# Patient Record
Sex: FEMALE | Race: WHITE | NOT HISPANIC OR LATINO | Employment: OTHER | URBAN - METROPOLITAN AREA
[De-identification: names, ages, dates, MRNs, and addresses within clinical notes are randomized per-mention and may not be internally consistent; named-entity substitution may affect disease eponyms.]

---

## 2018-02-14 ENCOUNTER — HOSPITAL ENCOUNTER (OUTPATIENT)
Facility: MEDICAL CENTER | Age: 63
End: 2018-02-14
Attending: OBSTETRICS & GYNECOLOGY
Payer: COMMERCIAL

## 2018-02-14 PROCEDURE — 88175 CYTOPATH C/V AUTO FLUID REDO: CPT

## 2018-02-16 LAB — CYTOLOGY REG CYTOL: NORMAL

## 2018-03-09 ENCOUNTER — HOSPITAL ENCOUNTER (OUTPATIENT)
Dept: LAB | Facility: MEDICAL CENTER | Age: 63
End: 2018-03-09
Attending: OBSTETRICS & GYNECOLOGY
Payer: COMMERCIAL

## 2018-03-09 LAB
APPEARANCE UR: CLEAR
BILIRUB UR QL STRIP.AUTO: NEGATIVE
COLOR UR: YELLOW
GLUCOSE UR STRIP.AUTO-MCNC: NEGATIVE MG/DL
KETONES UR STRIP.AUTO-MCNC: NEGATIVE MG/DL
LEUKOCYTE ESTERASE UR QL STRIP.AUTO: NEGATIVE
MICRO URNS: NORMAL
NITRITE UR QL STRIP.AUTO: NEGATIVE
PH UR STRIP.AUTO: 6 [PH]
PROT UR QL STRIP: NEGATIVE MG/DL
RBC UR QL AUTO: NEGATIVE
SP GR UR STRIP.AUTO: 1.01
UROBILINOGEN UR STRIP.AUTO-MCNC: 0.2 MG/DL

## 2018-03-09 PROCEDURE — 81003 URINALYSIS AUTO W/O SCOPE: CPT

## 2018-03-09 PROCEDURE — 87086 URINE CULTURE/COLONY COUNT: CPT

## 2018-03-11 LAB
BACTERIA UR CULT: NORMAL
SIGNIFICANT IND 70042: NORMAL
SITE SITE: NORMAL
SOURCE SOURCE: NORMAL

## 2018-03-13 ENCOUNTER — OFFICE VISIT (OUTPATIENT)
Dept: MEDICAL GROUP | Facility: PHYSICIAN GROUP | Age: 63
End: 2018-03-13
Payer: COMMERCIAL

## 2018-03-13 VITALS
SYSTOLIC BLOOD PRESSURE: 134 MMHG | BODY MASS INDEX: 28.68 KG/M2 | RESPIRATION RATE: 16 BRPM | WEIGHT: 168 LBS | OXYGEN SATURATION: 98 % | HEIGHT: 64 IN | DIASTOLIC BLOOD PRESSURE: 82 MMHG | HEART RATE: 80 BPM

## 2018-03-13 DIAGNOSIS — Z00.00 HEALTHCARE MAINTENANCE: ICD-10-CM

## 2018-03-13 DIAGNOSIS — E04.1 THYROID NODULE: ICD-10-CM

## 2018-03-13 DIAGNOSIS — G89.29 CHRONIC RUQ PAIN: ICD-10-CM

## 2018-03-13 DIAGNOSIS — K21.9 GASTROESOPHAGEAL REFLUX DISEASE, ESOPHAGITIS PRESENCE NOT SPECIFIED: ICD-10-CM

## 2018-03-13 DIAGNOSIS — E78.2 MIXED HYPERLIPIDEMIA: ICD-10-CM

## 2018-03-13 DIAGNOSIS — Z90.710 POST HYSTERECTOMY MENOPAUSE: ICD-10-CM

## 2018-03-13 DIAGNOSIS — E89.40 POST HYSTERECTOMY MENOPAUSE: ICD-10-CM

## 2018-03-13 DIAGNOSIS — E55.9 VITAMIN D INSUFFICIENCY: ICD-10-CM

## 2018-03-13 DIAGNOSIS — R10.11 CHRONIC RUQ PAIN: ICD-10-CM

## 2018-03-13 PROCEDURE — 99214 OFFICE O/P EST MOD 30 MIN: CPT | Performed by: NURSE PRACTITIONER

## 2018-03-13 RX ORDER — FAMOTIDINE 20 MG/1
20 TABLET, FILM COATED ORAL 2 TIMES DAILY
COMMUNITY
End: 2020-10-20

## 2018-03-13 RX ORDER — ATORVASTATIN CALCIUM 20 MG/1
TABLET, FILM COATED ORAL
Qty: 24 TAB | Refills: 1 | Status: SHIPPED | OUTPATIENT
Start: 2018-03-13 | End: 2018-08-09 | Stop reason: SDUPTHER

## 2018-03-13 RX ORDER — CHOLECALCIFEROL (VITAMIN D3) 125 MCG
CAPSULE ORAL
COMMUNITY

## 2018-03-13 RX ORDER — CHLORAL HYDRATE 500 MG
1000 CAPSULE ORAL
COMMUNITY
End: 2020-10-20

## 2018-03-13 ASSESSMENT — PATIENT HEALTH QUESTIONNAIRE - PHQ9: CLINICAL INTERPRETATION OF PHQ2 SCORE: 0

## 2018-03-13 NOTE — ASSESSMENT & PLAN NOTE
This is a chronic condition, controlled. She has brought in a copy of her last lipid profile, total cholesterol is 260, triglycerides are 176, HDL is 35 and her LDLs are 162. She states she has been tried on several different statins and is intolerant due to muscle aches. However she has never tried taking it just once or twice a week. She also states that she has never tried fenofibrate or other non-statin medications. She would like to try taking statin twice weekly, this has been called in for her. We will recheck lipids again in 3 months.

## 2018-03-13 NOTE — ASSESSMENT & PLAN NOTE
Patient has history of thyroid nodule, she is followed with thyroid labs and ultrasound of her thyroid, this has not been done for 2 years, thyroid ultrasound was ordered along with labs.

## 2018-03-13 NOTE — ASSESSMENT & PLAN NOTE
She is due for labs, please ordered. However we will wait on lipid profile as we are going to start her on a statin (see additional notes) and she will repeat labs again in 3 months. She is up-to-date with mammogram. She is followed by gynecology. She is up-to-date with colonoscopy and immunizations.

## 2018-03-13 NOTE — PROGRESS NOTES
Chief Complaint   Patient presents with   • Establish Care         This is a 62 y.o.female patient that presents today with the following: Establish care with new PCP, discuss acute and chronic conditions    Healthcare maintenance  She is due for labs, please ordered. However we will wait on lipid profile as we are going to start her on a statin (see additional notes) and she will repeat labs again in 3 months. She is up-to-date with mammogram. She is followed by gynecology. She is up-to-date with colonoscopy and immunizations.    Thyroid nodule  Patient has history of thyroid nodule, she is followed with thyroid labs and ultrasound of her thyroid, this has not been done for 2 years, thyroid ultrasound was ordered along with labs.    Mixed hyperlipidemia  This is a chronic condition, controlled. She has brought in a copy of her last lipid profile, total cholesterol is 260, triglycerides are 176, HDL is 35 and her LDLs are 162. She states she has been tried on several different statins and is intolerant due to muscle aches. However she has never tried taking it just once or twice a week. She also states that she has never tried fenofibrate or other non-statin medications. She would like to try taking statin twice weekly, this has been called in for her. We will recheck lipids again in 3 months.    Post hysterectomy menopause  Patient has vaginal menopausal symptoms since having her hysterectomy when she was in her early 40s. She is on Premarin, which is prescribed by her gynecologist. She tolerates this medication well with no significant bothersome side effects. She does understand the risks with ongoing hormone replacement therapy. She does have annual mammograms, in fact she does had her annual mammogram last week.    Gastroesophageal reflux disease  This is a chronic condition, stable and well controlled with famotidine 20 mg twice daily. She tolerates this well with no significant bothersome side effects. She  does buy this over-the-counter. She has been having chronic right upper quadrant pain and has been told in the past this could possibly be related to her gallbladder, it has not improved but it has not worsened either. Sometime in the future, she states that she would like to have this worked up but will come in sooner if the pain worsens. She states that it does get worse after eating high fat meals but she tries to avoid this.    Chronic RUQ pain  See additional notes on reflux    Vitamin D insufficiency  This is a chronic condition, stable and fairly well-controlled with over-the-counter vitamin D supplements. She is due for labs, these were ordered.      Hospital Outpatient Visit on 03/09/2018   Component Date Value   • Color 03/09/2018 Yellow    • Character 03/09/2018 Clear    • Specific Gravity 03/09/2018 1.011    • Ph 03/09/2018 6.0    • Glucose 03/09/2018 Negative    • Ketones 03/09/2018 Negative    • Protein 03/09/2018 Negative    • Bilirubin 03/09/2018 Negative    • Urobilinogen, Urine 03/09/2018 0.2    • Nitrite 03/09/2018 Negative    • Leukocyte Esterase 03/09/2018 Negative    • Occult Blood 03/09/2018 Negative    • Micro Urine Req 03/09/2018 see below    • Significant Indicator 03/09/2018 NEG    • Source 03/09/2018 UR    • Site 03/09/2018 Clean Catch    • Urine Culture 03/09/2018 Mixed skin jennyfer <10,000 cfu/mL    Hospital Outpatient Visit on 02/14/2018   Component Date Value   • Cytology Reg 02/14/2018 See Path Report          clinical course has been stable    Past Medical History:   Diagnosis Date   • Hyperlipidemia        Past Surgical History:   Procedure Laterality Date   • ABDOMINAL HYSTERECTOMY TOTAL     • BLADDER SLING FEMALE     • TONSILLECTOMY         History reviewed. No pertinent family history.    Sulfa drugs    Current Outpatient Prescriptions Or[facility] in [facility]   Medication Sig Dispense Refill   • Cholecalciferol (VITAMIN D3) 2000 units Tab Take  by mouth.     • Multiple Vitamins-Minerals  "(CENTRUM SILVER 50+WOMEN PO) Take  by mouth.     • Omega-3 Fatty Acids (FISH OIL) 1000 MG Cap capsule Take 1,000 mg by mouth 3 times a day, with meals.     • aspirin 81 MG tablet Take 81 mg by mouth every day.     • famotidine (PEPCID) 20 MG Tab Take 20 mg by mouth 2 times a day.     • D-MANNOSE PO Take  by mouth.     • atorvastatin (LIPITOR) 20 MG Tab Take 1 pill twice a week 24 Tab 1   • conjugated estrogen (PREMARIN) 0.625 MG/GM Cream Insert 0.5 g in vagina every day.       No current [facility]-ordered facility-administered medications on file.        Constitutional ROS: No unexpected change in weight, No weakness, No unexplained fevers, sweats, or chills  Pulmonary ROS: No chronic cough, sputum, or hemoptysis, No shortness of breath, No recent change in breathing  Cardiovascular ROS: No chest pain, No edema, No palpitations, Positive for hyperlipidemia, per history of present illness  Gastrointestinal ROS: Positive per history of present illness  Musculoskeletal/Extremities ROS: No clubbing, No peripheral edema, No pain, redness or swelling on the joints  Neurologic ROS: Normal development, No seizures, No weakness  Genitourinary ROS: Positive per history of present illness  Endocrine ROS: Positive per history of present illness    Physical exam:  /82   Pulse 80   Resp 16   Ht 1.626 m (5' 4\")   Wt 76.2 kg (168 lb)   SpO2 98%   BMI 28.84 kg/m²   General Appearance: Middle-aged older female, alert, no distress, moderately overweight, well-groomed  Skin: Skin color, texture, turgor normal. No rashes or lesions.  Lungs: negative findings: normal respiratory rate and rhythm, lungs clear to auscultation  Heart: negative. RRR without murmur, gallop, or rubs.  No ectopy.  Abdomen: Abdomen soft, non-tender. BS normal. No masses,  No organomegaly  Musculoskeletal: negative findings: ROM of all joints is normal, no evidence of joint instability, strength normal, no deformities present  Neurologic: intact, oriented, " mood appropriate, judgment intact. Cranial nerves II through XII grossly intact  Pelvic: deferred, patient is seen by OB/GYN    Medical decision making/discussion: Patient will have thyroid ultrasound for further surveillance of thyroid nodule. She is to have labs done sometime in April, but she is to have lipid profile done in late May or early June. She is going to start atorvastatin 20 mg twice weekly. She is to take her other medications as prescribed and call for refills as needed. She is to continue care per her OB/GYN.    Xiomara was seen today for establish care.    Diagnoses and all orders for this visit:    Thyroid nodule  -     TSH WITH REFLEX TO FT4; Future  -     US-SOFT TISSUES OF HEAD - NECK; Future    Mixed hyperlipidemia  -     COMP METABOLIC PANEL; Future  -     LIPID PROFILE; Future  -     atorvastatin (LIPITOR) 20 MG Tab; Take 1 pill twice a week    Post hysterectomy menopause    Gastroesophageal reflux disease, esophagitis presence not specified    Chronic RUQ pain    Vitamin D insufficiency  -     VITAMIN D,25 HYDROXY; Future    Healthcare maintenance          Please note that this dictation was created using voice recognition software. I have made every reasonable attempt to correct obvious errors, but I expect that there are errors of grammar and possibly content that I did not discover before finalizing the note.

## 2018-03-13 NOTE — PATIENT INSTRUCTIONS
thryoid us    Labs in April but lipids in late May or early June    Start a statin, only twice a week    Will plan on further working up the abdominal

## 2018-03-13 NOTE — ASSESSMENT & PLAN NOTE
Patient has vaginal menopausal symptoms since having her hysterectomy when she was in her early 40s. She is on Premarin, which is prescribed by her gynecologist. She tolerates this medication well with no significant bothersome side effects. She does understand the risks with ongoing hormone replacement therapy. She does have annual mammograms, in fact she does had her annual mammogram last week.

## 2018-03-13 NOTE — ASSESSMENT & PLAN NOTE
This is a chronic condition, stable and fairly well-controlled with over-the-counter vitamin D supplements. She is due for labs, these were ordered.

## 2018-03-13 NOTE — ASSESSMENT & PLAN NOTE
This is a chronic condition, stable and well controlled with famotidine 20 mg twice daily. She tolerates this well with no significant bothersome side effects. She does buy this over-the-counter. She has been having chronic right upper quadrant pain and has been told in the past this could possibly be related to her gallbladder, it has not improved but it has not worsened either. Sometime in the future, she states that she would like to have this worked up but will come in sooner if the pain worsens. She states that it does get worse after eating high fat meals but she tries to avoid this.

## 2018-03-26 ENCOUNTER — HOSPITAL ENCOUNTER (OUTPATIENT)
Dept: RADIOLOGY | Facility: MEDICAL CENTER | Age: 63
End: 2018-03-26
Attending: NURSE PRACTITIONER
Payer: COMMERCIAL

## 2018-03-26 DIAGNOSIS — E04.1 THYROID NODULE: ICD-10-CM

## 2018-03-26 PROCEDURE — 76536 US EXAM OF HEAD AND NECK: CPT

## 2018-03-29 ENCOUNTER — TELEPHONE (OUTPATIENT)
Dept: MEDICAL GROUP | Facility: PHYSICIAN GROUP | Age: 63
End: 2018-03-29

## 2018-03-29 NOTE — TELEPHONE ENCOUNTER
Called [phone] [facility] for patient to call [phone] to go over provider notes.

## 2018-03-29 NOTE — TELEPHONE ENCOUNTER
----- Message from JANUSZ Dumont sent at 3/28/2018 12:59 PM PDT -----  Please let pt know that I have reviewed the results of her thyroid US. It would be important to be able to compare this to previous ultrasound as nodules are seen, but may need to be biopsied, unless they are unchanged from previous exams. Where would we be able to get a hold of these results?

## 2018-03-30 ENCOUNTER — TELEPHONE (OUTPATIENT)
Dept: MEDICAL GROUP | Facility: PHYSICIAN GROUP | Age: 63
End: 2018-03-30

## 2018-04-25 LAB — 25(OH)D3+25(OH)D2 SERPL-MCNC: 37 NG/ML (ref 30–100)

## 2018-06-13 LAB
ALBUMIN SERPL-MCNC: 4.3 G/DL (ref 3.6–4.8)
ALBUMIN/GLOB SERPL: 1.7 {RATIO} (ref 1.2–2.2)
ALP SERPL-CCNC: 100 IU/L (ref 39–117)
ALT SERPL-CCNC: 24 IU/L (ref 0–32)
AST SERPL-CCNC: 24 IU/L (ref 0–40)
BILIRUB SERPL-MCNC: 0.6 MG/DL (ref 0–1.2)
BUN SERPL-MCNC: 14 MG/DL (ref 8–27)
BUN/CREAT SERPL: 16 (ref 12–28)
CALCIUM SERPL-MCNC: 9.7 MG/DL (ref 8.7–10.3)
CHLORIDE SERPL-SCNC: 105 MMOL/L (ref 96–106)
CHOLEST SERPL-MCNC: 184 MG/DL (ref 100–199)
CO2 SERPL-SCNC: 22 MMOL/L (ref 20–29)
CREAT SERPL-MCNC: 0.88 MG/DL (ref 0.57–1)
GFR SERPLBLD CREATININE-BSD FMLA CKD-EPI: 70 ML/MIN/1.73
GFR SERPLBLD CREATININE-BSD FMLA CKD-EPI: 81 ML/MIN/1.73
GLOBULIN SER CALC-MCNC: 2.6 G/DL (ref 1.5–4.5)
GLUCOSE SERPL-MCNC: 96 MG/DL (ref 65–99)
HDLC SERPL-MCNC: 55 MG/DL
LABORATORY COMMENT REPORT: ABNORMAL
LDLC SERPL CALC-MCNC: 101 MG/DL (ref 0–99)
POTASSIUM SERPL-SCNC: 4.8 MMOL/L (ref 3.5–5.2)
PROT SERPL-MCNC: 6.9 G/DL (ref 6–8.5)
SODIUM SERPL-SCNC: 144 MMOL/L (ref 134–144)
TRIGL SERPL-MCNC: 139 MG/DL (ref 0–149)
TSH SERPL DL<=0.005 MIU/L-ACNC: 0.41 UIU/ML (ref 0.45–4.5)
VLDLC SERPL CALC-MCNC: 28 MG/DL (ref 5–40)

## 2018-06-20 ENCOUNTER — OFFICE VISIT (OUTPATIENT)
Dept: MEDICAL GROUP | Facility: PHYSICIAN GROUP | Age: 63
End: 2018-06-20
Payer: COMMERCIAL

## 2018-06-20 VITALS
DIASTOLIC BLOOD PRESSURE: 72 MMHG | HEART RATE: 86 BPM | HEIGHT: 64 IN | TEMPERATURE: 97.6 F | RESPIRATION RATE: 16 BRPM | SYSTOLIC BLOOD PRESSURE: 120 MMHG | BODY MASS INDEX: 28.51 KG/M2 | WEIGHT: 167 LBS | OXYGEN SATURATION: 97 %

## 2018-06-20 DIAGNOSIS — E04.1 THYROID NODULE: ICD-10-CM

## 2018-06-20 DIAGNOSIS — E78.2 MIXED HYPERLIPIDEMIA: ICD-10-CM

## 2018-06-20 PROCEDURE — 99214 OFFICE O/P EST MOD 30 MIN: CPT | Performed by: NURSE PRACTITIONER

## 2018-06-20 NOTE — PROGRESS NOTES
Chief Complaint   Patient presents with   • Hyperlipidemia     fv labs         This is a 62 y.o.female patient that presents today with the following:follow up, review labs    Mixed hyperlipidemia  This is a chronic condition, stable and controlled with atorvastatin. Recent lipid profile is as follows:  Component      Latest Ref Rng & Units 6/12/2018          10:45 AM   Cholesterol,Tot      100 - 199 mg/dL 184   Triglycerides      0 - 149 mg/dL 139   HDL      >39 mg/dL 55   VLDL Cholesterol Calc      5 - 40 mg/dL 28   LDL      0 - 99 mg/dL 101 (H)   She is having increased muscle aches, even only taking this twice a week. Will have her try taking it once a week at 40 mg. Will have to consider another medication if she continues to have this side effect.    Thyroid nodule  Pt has history of thyroid nodules. She recently had thyroid US, when compared to the US she had back in 2015, it is essentially unchanged. Her thyroid labs are WNL. Will plan on repeating this in 3-6 months with labs before visit. She denies s/sx of hypothyroidism.      No visits with results within 1 Month(s) from this visit.   Latest known visit with results is:   Orders Only on 04/24/2018   Component Date Value   • 25-Hydroxy   Vitamin D 25 04/24/2018 37.0    • Glucose 06/12/2018 96    • Bun 06/12/2018 14    • Creatinine 06/12/2018 0.88    • GFR If Non  Ameri* 06/12/2018 70    • GFR If  06/12/2018 81    • Bun-Creatinine Ratio 06/12/2018 16    • Sodium 06/12/2018 144    • Potassium 06/12/2018 4.8    • Chloride 06/12/2018 105    • Co2 06/12/2018 22    • Calcium 06/12/2018 9.7    • Total Protein 06/12/2018 6.9    • Albumin 06/12/2018 4.3    • Globulin 06/12/2018 2.6    • A-G Ratio 06/12/2018 1.7    • Total Bilirubin 06/12/2018 0.6    • Alkaline Phosphatase 06/12/2018 100    • AST(SGOT) 06/12/2018 24    • ALT(SGPT) 06/12/2018 24    • Cholesterol,Tot 06/12/2018 184    • Triglycerides 06/12/2018 139    • HDL 06/12/2018 55    •  "VLDL Cholesterol Calc 06/12/2018 28    • LDL 06/12/2018 101*   • Comment: 06/12/2018 CANCELED    • TSH 06/12/2018 0.414*         clinical course has been stable    Past Medical History:   Diagnosis Date   • Hyperlipidemia        Past Surgical History:   Procedure Laterality Date   • ABDOMINAL HYSTERECTOMY TOTAL     • BLADDER SLING FEMALE     • TONSILLECTOMY         History reviewed. No pertinent family history.    Sulfa drugs    Current Outpatient Prescriptions Ordered in [facility]   Medication Sig Dispense Refill   • Cholecalciferol (VITAMIN D3) 2000 units Tab Take  by mouth.     • Multiple Vitamins-Minerals (CENTRUM SILVER 50+WOMEN PO) Take  by mouth.     • Omega-3 Fatty Acids (FISH OIL) 1000 MG Cap capsule Take 1,000 mg by mouth 3 times a day, with meals.     • aspirin 81 MG tablet Take 81 mg by mouth every day.     • famotidine (PEPCID) 20 MG Tab Take 20 mg by mouth 2 times a day.     • D-MANNOSE PO Take  by mouth.     • atorvastatin (LIPITOR) 20 MG Tab Take 1 pill twice a week 24 Tab 1   • conjugated estrogen (PREMARIN) 0.625 MG/GM Cream Insert 0.5 g in vagina every day.       No current [facility]-ordered facility-administered medications on file.        Constitutional ROS: No unexpected change in weight, No weakness, No unexplained fevers, sweats, or chills  Pulmonary ROS: No chronic cough, sputum, or hemoptysis, No shortness of breath, No recent change in breathing  Cardiovascular ROS: No chest pain, No edema, No palpitations. Positive for hyperlipidemia  Musculoskeletal/Extremities ROS: No clubbing, No peripheral edema, No redness or swelling on the joints. Positive for myalgias  Neurologic ROS: Normal development, No seizures, No weakness  Endocrine ROS: positive per HPI    Physical exam:  /72   Pulse 86   Temp 36.4 °C (97.6 °F)   Resp 16   Ht 1.626 m (5' 4\")   Wt 75.8 kg (167 lb)   SpO2 97%   BMI 28.67 kg/m²   General Appearance: older female, alert, no distress, over weight, well groomed  Skin: Skin " color, texture, turgor normal. No rashes or lesions.  Neck: negative findings: no adenopathy, trachea midline and normal to palpitation. Thyroid nodular  Lungs: negative findings: normal respiratory rate and rhythm, normal effort, clear to auscultation throughout  Heart: negative. RRR without murmur, gallop, or rubs.  No ectopy.  Abdomen: Abdomen soft, non-tender. BS normal. No masses,  No organomegaly  Musculoskeletal: negative findings: ROM of all joints is normal, no evidence of joint instability, strength normal, no deformities present  Neurologic: intact, CN 2-12 grossly intact    Medical decision making/discussion: will have her increase atorvastatin to 40 mg once a week. Will also check labs and thyroid US in 3-6 months,  She is then to follow up with me  After that.     Xiomara was seen today for hyperlipidemia.    Diagnoses and all orders for this visit:    Thyroid nodule  -     TSH WITH REFLEX TO FT4; Future  -     US-SOFT TISSUES OF HEAD - NECK; Future    Mixed hyperlipidemia          Please note that this dictation was created using voice recognition software. I have made every reasonable attempt to correct obvious errors, but I expect that there are errors of grammar and possibly content that I did not discover before finalizing the note.

## 2018-06-20 NOTE — PATIENT INSTRUCTIONS
Will have you increase the atorvastatin to 40 mg, but only take once a week    Let's check one more US in 6 months, labs as well, then see me after

## 2018-06-21 NOTE — ASSESSMENT & PLAN NOTE
Pt has history of thyroid nodules. She recently had thyroid US, when compared to the US she had back in 2015, it is essentially unchanged. Her thyroid labs are WNL. Will plan on repeating this in 3-6 months with labs before visit. She denies s/sx of hypothyroidism.

## 2018-06-21 NOTE — ASSESSMENT & PLAN NOTE
This is a chronic condition, stable and controlled with atorvastatin. Recent lipid profile is as follows:  Component      Latest Ref Rng & Units 6/12/2018          10:45 AM   Cholesterol,Tot      100 - 199 mg/dL 184   Triglycerides      0 - 149 mg/dL 139   HDL      >39 mg/dL 55   VLDL Cholesterol Calc      5 - 40 mg/dL 28   LDL      0 - 99 mg/dL 101 (H)   She is having increased muscle aches, even only taking this twice a week. Will have her try taking it once a week at 40 mg. Will have to consider another medication if she continues to have this side effect.

## 2018-08-09 DIAGNOSIS — E78.2 MIXED HYPERLIPIDEMIA: ICD-10-CM

## 2018-08-10 RX ORDER — ATORVASTATIN CALCIUM 20 MG/1
TABLET, FILM COATED ORAL
Qty: 24 TAB | Refills: 0 | Status: SHIPPED | OUTPATIENT
Start: 2018-08-10 | End: 2019-01-18 | Stop reason: SDUPTHER

## 2018-08-10 NOTE — TELEPHONE ENCOUNTER
Was the patient seen in the last year in this department? Yes    Does patient have an active prescription for medications requested? No     Received Request Via: Pharmacy      Pt met protocol?: Yes, OV 6/18   Lab Results   Component Value Date/Time    CHOLSTRLTOT 184 06/12/2018 10:45 AM     (H) 06/12/2018 10:45 AM    HDL 55 06/12/2018 10:45 AM    TRIGLYCERIDE 139 06/12/2018 10:45 AM       Lab Results   Component Value Date/Time    SODIUM 144 06/12/2018 10:45 AM    POTASSIUM 4.8 06/12/2018 10:45 AM    CHLORIDE 105 06/12/2018 10:45 AM    CO2 22 06/12/2018 10:45 AM    GLUCOSE 96 06/12/2018 10:45 AM    BUN 14 06/12/2018 10:45 AM    CREATININE 0.88 06/12/2018 10:45 AM    BUNCREATRAT 16 06/12/2018 10:45 AM     Lab Results   Component Value Date/Time    ALKPHOSPHAT 100 06/12/2018 10:45 AM    ASTSGOT 24 06/12/2018 10:45 AM    ALTSGPT 24 06/12/2018 10:45 AM    TBILIRUBIN 0.6 06/12/2018 10:45 AM

## 2018-08-15 ENCOUNTER — OFFICE VISIT (OUTPATIENT)
Dept: URGENT CARE | Facility: PHYSICIAN GROUP | Age: 63
End: 2018-08-15
Payer: COMMERCIAL

## 2018-08-15 ENCOUNTER — HOSPITAL ENCOUNTER (OUTPATIENT)
Facility: MEDICAL CENTER | Age: 63
End: 2018-08-15
Attending: PHYSICIAN ASSISTANT
Payer: COMMERCIAL

## 2018-08-15 VITALS
HEART RATE: 108 BPM | DIASTOLIC BLOOD PRESSURE: 88 MMHG | TEMPERATURE: 98 F | SYSTOLIC BLOOD PRESSURE: 140 MMHG | HEIGHT: 64 IN | OXYGEN SATURATION: 97 % | WEIGHT: 166 LBS | RESPIRATION RATE: 18 BRPM | BODY MASS INDEX: 28.34 KG/M2

## 2018-08-15 DIAGNOSIS — R10.30 LOWER ABDOMINAL PAIN: ICD-10-CM

## 2018-08-15 LAB
APPEARANCE UR: NORMAL
BILIRUB UR STRIP-MCNC: NORMAL MG/DL
COLOR UR AUTO: YELLOW
GLUCOSE UR STRIP.AUTO-MCNC: NORMAL MG/DL
KETONES UR STRIP.AUTO-MCNC: NORMAL MG/DL
LEUKOCYTE ESTERASE UR QL STRIP.AUTO: NORMAL
NITRITE UR QL STRIP.AUTO: NORMAL
PH UR STRIP.AUTO: 5 [PH] (ref 5–8)
PROT UR QL STRIP: NORMAL MG/DL
RBC UR QL AUTO: NORMAL
SP GR UR STRIP.AUTO: 1.02
UROBILINOGEN UR STRIP-MCNC: 0.2 MG/DL

## 2018-08-15 PROCEDURE — 87086 URINE CULTURE/COLONY COUNT: CPT

## 2018-08-15 PROCEDURE — 81002 URINALYSIS NONAUTO W/O SCOPE: CPT | Performed by: PHYSICIAN ASSISTANT

## 2018-08-15 PROCEDURE — 99214 OFFICE O/P EST MOD 30 MIN: CPT | Performed by: PHYSICIAN ASSISTANT

## 2018-08-15 RX ORDER — NITROFURANTOIN 25; 75 MG/1; MG/1
100 CAPSULE ORAL EVERY 12 HOURS
Qty: 10 CAP | Refills: 0 | Status: SHIPPED | OUTPATIENT
Start: 2018-08-15 | End: 2018-08-20

## 2018-08-15 NOTE — PROGRESS NOTES
Chief Complaint   Patient presents with   • UTI     cramping/ lower abd pain/ x1wk on and off       HISTORY OF PRESENT ILLNESS: Patient is a 62 y.o. female who presents today for the following:    Patient comes in for evaluation of lower abdominal cramping and pain over the last 2 weeks.  She has had multiple pelvic surgeries and states she does not have classic urinary tract infection symptoms.  She reports very rare dysuria stating she mostly has abdominal cramping.  She states that this has happened to her in the past with urinary tract infections since her pelvic surgeries.  She denies nausea, vomiting, and any significant changes in her bowels.  She has not noticed any fever, night sweats, chills, or body aches.  She is followed by urogyn.    Patient Active Problem List    Diagnosis Date Noted   • Thyroid nodule 03/13/2018   • Mixed hyperlipidemia 03/13/2018   • Post hysterectomy menopause 03/13/2018   • Gastroesophageal reflux disease 03/13/2018   • Chronic RUQ pain 03/13/2018   • Vitamin D insufficiency 03/13/2018   • Healthcare maintenance 03/13/2018       Allergies:Sulfa drugs    Current Outpatient Prescriptions Ordered in [facility]   Medication Sig Dispense Refill   • nitrofurantoin monohydr macro (MACROBID) 100 MG Cap Take 1 Cap by mouth every 12 hours for 5 days. 10 Cap 0   • atorvastatin (LIPITOR) 20 MG Tab TAKE 1 TABLET BY MOUTH TWICE WEEKLY 24 Tab 0   • Cholecalciferol (VITAMIN D3) 2000 units Tab Take  by mouth.     • Multiple Vitamins-Minerals (CENTRUM SILVER 50+WOMEN PO) Take  by mouth.     • Omega-3 Fatty Acids (FISH OIL) 1000 MG Cap capsule Take 1,000 mg by mouth 3 times a day, with meals.     • aspirin 81 MG tablet Take 81 mg by mouth every day.     • famotidine (PEPCID) 20 MG Tab Take 20 mg by mouth 2 times a day.     • D-MANNOSE PO Take  by mouth.     • conjugated estrogen (PREMARIN) 0.625 MG/GM Cream Insert 0.5 g in vagina every day.       No current [facility]-ordered facility-administered medications  "on file.        Past Medical History:   Diagnosis Date   • Hyperlipidemia        Social History   Substance Use Topics   • Smoking status: Never Smoker   • Smokeless tobacco: Never Used   • Alcohol use No       No family status information on file.   No family history on file.    Review of Systems:   Constitutional ROS: No unexpected change in weight, No weakness, No fatigue  Eye ROS: No recent significant change in vision, No eye pain, redness, discharge  Ear ROS: No drainage, No tinnitus or vertigo, No recent change in hearing  Mouth/Throat ROS: No teeth or gum problems, No bleeding gums, No tongue complaints  Neck ROS: No swollen glands, No significant pain in neck  Pulmonary ROS: No chronic cough, sputum, or hemoptysis, No dyspnea on exertion, No wheezing  Cardiovascular ROS: No diaphoresis, No edema, No palpitations  Gastrointestinal ROS: No change in bowel habits, No significant change in appetite, No nausea, vomiting, diarrhea, or constipation  Musculoskeletal/Extremities ROS: No peripheral edema, No pain, redness or swelling on the joints  Hematologic/Lymphatic ROS: No chills, No night sweats, No weight loss  Skin/Integumentary ROS: No edema, No evidence of rash, No itching      Exam:  Blood pressure 140/88, pulse (!) 108, temperature 36.7 °C (98 °F), resp. rate 18, height 1.626 m (5' 4\"), weight 75.3 kg (166 lb), SpO2 97 %.  General: Well developed, well nourished. No distress.  HEENT: Head is grossly normal.  Pulmonary: Unlabored respiratory effort.   Neurologic: Grossly nonfocal. No facial asymmetry noted.  Skin: Warm, dry, good turgor. No rashes in visible areas.   Psych: Normal mood. Alert and oriented x3. Judgment and insight is normal.    UA: Trace blood, otherwise negative    Assessment/Plan:  Patient symptoms are not classic for UTI but will treat based on her history.  Will contact patient with culture results.  1. Lower abdominal pain  Urine Culture    POCT Urinalysis    nitrofurantoin monohydr " macro (MACROBID) 100 MG Cap

## 2018-08-16 DIAGNOSIS — R10.30 LOWER ABDOMINAL PAIN: ICD-10-CM

## 2018-08-18 LAB
BACTERIA UR CULT: NORMAL
SIGNIFICANT IND 70042: NORMAL
SITE SITE: NORMAL
SOURCE SOURCE: NORMAL

## 2018-08-21 ENCOUNTER — TELEPHONE (OUTPATIENT)
Dept: MEDICAL GROUP | Facility: PHYSICIAN GROUP | Age: 63
End: 2018-08-21

## 2018-08-21 NOTE — TELEPHONE ENCOUNTER
Called and informed pt of urine culture results pt states she finished her medication and is feeling better but will inform us of any changes and any continued symptoms

## 2018-12-04 ENCOUNTER — HOSPITAL ENCOUNTER (OUTPATIENT)
Dept: RADIOLOGY | Facility: MEDICAL CENTER | Age: 63
End: 2018-12-04
Attending: NURSE PRACTITIONER
Payer: COMMERCIAL

## 2018-12-04 DIAGNOSIS — E04.1 THYROID NODULE: ICD-10-CM

## 2018-12-04 PROCEDURE — 76536 US EXAM OF HEAD AND NECK: CPT

## 2018-12-05 ENCOUNTER — HOSPITAL ENCOUNTER (OUTPATIENT)
Dept: LAB | Facility: MEDICAL CENTER | Age: 63
End: 2018-12-05
Attending: NURSE PRACTITIONER
Payer: COMMERCIAL

## 2018-12-05 DIAGNOSIS — E04.1 THYROID NODULE: ICD-10-CM

## 2018-12-05 LAB — TSH SERPL DL<=0.005 MIU/L-ACNC: 0.55 UIU/ML (ref 0.38–5.33)

## 2018-12-05 PROCEDURE — 36415 COLL VENOUS BLD VENIPUNCTURE: CPT

## 2018-12-05 PROCEDURE — 84443 ASSAY THYROID STIM HORMONE: CPT

## 2018-12-10 ENCOUNTER — OFFICE VISIT (OUTPATIENT)
Dept: MEDICAL GROUP | Facility: PHYSICIAN GROUP | Age: 63
End: 2018-12-10
Payer: COMMERCIAL

## 2018-12-10 VITALS
SYSTOLIC BLOOD PRESSURE: 120 MMHG | RESPIRATION RATE: 16 BRPM | HEIGHT: 64 IN | DIASTOLIC BLOOD PRESSURE: 82 MMHG | TEMPERATURE: 98.2 F | WEIGHT: 165 LBS | HEART RATE: 76 BPM | BODY MASS INDEX: 28.17 KG/M2 | OXYGEN SATURATION: 98 %

## 2018-12-10 DIAGNOSIS — E55.9 VITAMIN D INSUFFICIENCY: ICD-10-CM

## 2018-12-10 DIAGNOSIS — Z12.39 SCREENING FOR BREAST CANCER: ICD-10-CM

## 2018-12-10 DIAGNOSIS — E78.2 MIXED HYPERLIPIDEMIA: ICD-10-CM

## 2018-12-10 DIAGNOSIS — E04.1 THYROID NODULE: ICD-10-CM

## 2018-12-10 PROCEDURE — 99214 OFFICE O/P EST MOD 30 MIN: CPT | Performed by: NURSE PRACTITIONER

## 2018-12-10 NOTE — ASSESSMENT & PLAN NOTE
Patient has history of vitamin D deficiency, she will be due for labs again in 1 year.  She does continue to take an over-the-counter vitamin D supplement.

## 2018-12-10 NOTE — ASSESSMENT & PLAN NOTE
Patient has history of thyroid nodules, she recently had repeat thyroid ultrasound which is essentially is unchanged from ultrasound done in March 2018 as well as the one done in 2015.  Her thyroid labs have been within normal limits.  She will be due for labs and repeat ultrasound again in 1 year.

## 2018-12-10 NOTE — ASSESSMENT & PLAN NOTE
The 10-year ASCVD risk score (Gricelda MEDEROS Jr., et al., 2013) is: 4.2%    Values used to calculate the score:      Age: 63 years      Sex: Female      Is Non- : No      Diabetic: No      Tobacco smoker: No      Systolic Blood Pressure: 120 mmHg      Is BP treated: No      HDL Cholesterol: 55 mg/dL      Total Cholesterol: 184 mg/dL  Patient continues on atorvastatin 20 mg 1 pill weekly and is doing well.  She does not need refills at this time, she was advised to eat healthy low-fat, low-cholesterol diet, regular physical activity and continued efforts towards weight loss.  We will plan on rechecking labs again in 1 year.

## 2018-12-10 NOTE — PROGRESS NOTES
Chief Complaint   Patient presents with   • Hypothyroidism     fv US, labs         This is a 63 y.o.female patient that presents today with the following: Follow-up, review ultrasound and lab results    Thyroid nodule  Patient has history of thyroid nodules, she recently had repeat thyroid ultrasound which is essentially is unchanged from ultrasound done in March 2018 as well as the one done in 2015.  Her thyroid labs have been within normal limits.  She will be due for labs and repeat ultrasound again in 1 year.    Mixed hyperlipidemia  The 10-year ASCVD risk score (Musella GATITO Jr., et al., 2013) is: 4.2%    Values used to calculate the score:      Age: 63 years      Sex: Female      Is Non- : No      Diabetic: No      Tobacco smoker: No      Systolic Blood Pressure: 120 mmHg      Is BP treated: No      HDL Cholesterol: 55 mg/dL      Total Cholesterol: 184 mg/dL  Patient continues on atorvastatin 20 mg 1 pill weekly and is doing well.  She does not need refills at this time, she was advised to eat healthy low-fat, low-cholesterol diet, regular physical activity and continued efforts towards weight loss.  We will plan on rechecking labs again in 1 year.    Vitamin D insufficiency  Patient has history of vitamin D deficiency, she will be due for labs again in 1 year.  She does continue to take an over-the-counter vitamin D supplement.      Hospital Outpatient Visit on 12/05/2018   Component Date Value   • TSH 12/05/2018 0.550          clinical course has been stable    Past Medical History:   Diagnosis Date   • Hyperlipidemia        Past Surgical History:   Procedure Laterality Date   • ABDOMINAL HYSTERECTOMY TOTAL     • BLADDER SLING FEMALE     • TONSILLECTOMY         No family history on file.    Sulfa drugs    Current Outpatient Prescriptions Ordered in [facility]   Medication Sig Dispense Refill   • atorvastatin (LIPITOR) 20 MG Tab TAKE 1 TABLET BY MOUTH TWICE WEEKLY (Patient taking differently:  "TAKE 1 TABLET BY MOUTH ONCE WEEKLY) 24 Tab 0   • Cholecalciferol (VITAMIN D3) 2000 units Tab Take  by mouth.     • Multiple Vitamins-Minerals (CENTRUM SILVER 50+WOMEN PO) Take  by mouth.     • Omega-3 Fatty Acids (FISH OIL) 1000 MG Cap capsule Take 1,000 mg by mouth 3 times a day, with meals.     • aspirin 81 MG tablet Take 81 mg by mouth every day.     • famotidine (PEPCID) 20 MG Tab Take 20 mg by mouth 2 times a day.     • conjugated estrogen (PREMARIN) 0.625 MG/GM Cream Insert 0.5 g in vagina every day.     • D-MANNOSE PO Take  by mouth.       No current Epic-ordered facility-administered medications on file.        Constitutional ROS: No unexpected change in weight, No weakness, No unexplained fevers, sweats, or chills  Neck ROS: Positive per HPI  Pulmonary ROS: No chronic cough, sputum, or hemoptysis, No shortness of breath, No recent change in breathing  Cardiovascular ROS: No chest pain, No edema, No palpitations, Positive for hyperlipidemia  Musculoskeletal/Extremities ROS: No clubbing, No peripheral edema, No pain, redness or swelling on the joints  Neurologic ROS: Normal development, No seizures, No weakness  Endocrine ROS: Positive per HPI    Physical exam:  /82 (BP Location: Right arm, Patient Position: Sitting, BP Cuff Size: Adult)   Pulse 76   Temp 36.8 °C (98.2 °F) (Temporal)   Resp 16   Ht 1.626 m (5' 4\")   Wt 74.8 kg (165 lb)   SpO2 98%   BMI 28.32 kg/m²   General Appearance: Older female, alert, no distress, overweight, well-groomed  Skin: Skin color, texture, turgor normal. No rashes or lesions.  Neck: Neck supple. No adenopathy. Thyroid symmetric, normal size, and without nodularity  Lungs: negative findings: normal respiratory rate and rhythm, lungs clear to auscultation  Heart: negative. RRR without murmur, gallop, or rubs.  No ectopy.  Abdomen: Abdomen soft, non-tender. BS normal. No masses,  No organomegaly  Musculoskeletal: negative findings: no evidence of joint instability, no " evidence of muscle atrophy, no deformities present  Neurologic: intact, CN II through XII grossly intact    Medical decision making/discussion: Patient to follow-up with me annually with routine labs done before visit.  We will plan on repeating thyroid ultrasound in one year.  She is to see me sooner should she develop other signs and symptoms of hypothyroidism.  Mammogram has been ordered, she is to have this done sometime in late March 2019.    Xiomara was seen today for hypothyroidism.    Diagnoses and all orders for this visit:    Mixed hyperlipidemia  -     COMP METABOLIC PANEL; Future  -     Lipid Profile; Future    Thyroid nodule  -     TSH WITH REFLEX TO FT4; Future    Vitamin D insufficiency  -     VITAMIN D,25 HYDROXY; Future    Screening for breast cancer  -     MA-SCREEN MAMMO W/CAD-BILAT; Future          Please note that this dictation was created using voice recognition software. I have made every reasonable attempt to correct obvious errors, but I expect that there are errors of grammar and possibly content that I did not discover before finalizing the note.

## 2018-12-25 ENCOUNTER — OFFICE VISIT (OUTPATIENT)
Dept: URGENT CARE | Facility: PHYSICIAN GROUP | Age: 63
End: 2018-12-25
Payer: COMMERCIAL

## 2018-12-25 VITALS
DIASTOLIC BLOOD PRESSURE: 82 MMHG | HEIGHT: 65 IN | TEMPERATURE: 98.4 F | SYSTOLIC BLOOD PRESSURE: 124 MMHG | OXYGEN SATURATION: 98 % | BODY MASS INDEX: 26.89 KG/M2 | WEIGHT: 161.4 LBS | HEART RATE: 79 BPM | RESPIRATION RATE: 16 BRPM

## 2018-12-25 DIAGNOSIS — J22 ACUTE RESPIRATORY INFECTION: ICD-10-CM

## 2018-12-25 DIAGNOSIS — R05.9 COUGH: ICD-10-CM

## 2018-12-25 DIAGNOSIS — J20.9 ACUTE BRONCHITIS, UNSPECIFIED ORGANISM: ICD-10-CM

## 2018-12-25 PROCEDURE — 99214 OFFICE O/P EST MOD 30 MIN: CPT | Performed by: FAMILY MEDICINE

## 2018-12-25 RX ORDER — BENZONATATE 200 MG/1
CAPSULE ORAL
Qty: 30 CAP | Refills: 0 | Status: SHIPPED | OUTPATIENT
Start: 2018-12-25 | End: 2019-06-11

## 2018-12-25 RX ORDER — AMOXICILLIN 875 MG/1
TABLET, COATED ORAL
Qty: 20 TAB | Refills: 0 | Status: SHIPPED | OUTPATIENT
Start: 2018-12-25 | End: 2019-06-11

## 2018-12-25 NOTE — PROGRESS NOTES
Chief Complaint:    Chief Complaint   Patient presents with   • Cough     x 2 weeks   • Congestion       History of Present Illness:    This is a new problem. Symptoms x 2 weeks. Has occl subjective fever, rhinorrhea, mild intermittent sore throat, and cough which was worse last night. Overall symptoms are at least moderate severity and not getting better. She occl gets Bronchitis and feels she is getting it again. Amoxil and Tessalon work/tolerates for similar previous symptoms.      Review of Systems:    Constitutional: See HPI.   Eyes: Negative for change in vision, photophobia, pain, redness, and discharge.  ENT: See HPI.   Respiratory: See HPI.   Cardiovascular: Negative for chest pain, palpitations, orthopnea, claudication, leg swelling, and PND.   Gastrointestinal: Negative for abdominal pain, nausea, vomiting, diarrhea, constipation, blood in stool, and melena.   Genitourinary: Negative for dysuria, urinary urgency, urinary frequency, hematuria, and flank pain.   Musculoskeletal: Negative for myalgias, joint pain, neck pain, and back pain.   Skin: Negative for rash and itching.   Neurological: Negative for dizziness, tingling, tremors, sensory change, speech change, focal weakness, seizures, loss of consciousness, and headaches.   Endo: Negative for polydipsia.   Heme: Does not bruise/bleed easily.   Psychiatric/Behavioral: Negative for depression, suicidal ideas, hallucinations, memory loss and substance abuse. The patient is not nervous/anxious and does not have insomnia.        Past Medical History:    Past Medical History:   Diagnosis Date   • Hyperlipidemia      Past Surgical History:    Past Surgical History:   Procedure Laterality Date   • ABDOMINAL HYSTERECTOMY TOTAL     • BLADDER SLING FEMALE     • TONSILLECTOMY       Social History:    Social History     Social History   • Marital status:      Spouse name: N/A   • Number of children: N/A   • Years of education: N/A     Occupational History  "  • Not on file.     Social History Main Topics   • Smoking status: Never Smoker   • Smokeless tobacco: Never Used   • Alcohol use No   • Drug use: No   • Sexual activity: Not on file     Other Topics Concern   • Not on file     Social History Narrative   • No narrative on file     Family History:    History reviewed. No pertinent family history.    Medications:    Current Outpatient Prescriptions on File Prior to Visit   Medication Sig Dispense Refill   • atorvastatin (LIPITOR) 20 MG Tab TAKE 1 TABLET BY MOUTH TWICE WEEKLY (Patient taking differently: TAKE 1 TABLET BY MOUTH ONCE WEEKLY) 24 Tab 0   • Cholecalciferol (VITAMIN D3) 2000 units Tab Take  by mouth.     • Multiple Vitamins-Minerals (CENTRUM SILVER 50+WOMEN PO) Take  by mouth.     • Omega-3 Fatty Acids (FISH OIL) 1000 MG Cap capsule Take 1,000 mg by mouth 3 times a day, with meals.     • aspirin 81 MG tablet Take 81 mg by mouth every day.     • famotidine (PEPCID) 20 MG Tab Take 20 mg by mouth 2 times a day.     • conjugated estrogen (PREMARIN) 0.625 MG/GM Cream Insert 0.5 g in vagina every day.     • D-MANNOSE PO Take  by mouth.       No current facility-administered medications on file prior to visit.      Allergies:    Allergies   Allergen Reactions   • Sulfa Drugs Rash and Vomiting       Vitals:    Vitals:    12/25/18 1129   BP: 124/82   Pulse: 79   Resp: 16   Temp: 36.9 °C (98.4 °F)   TempSrc: Temporal   SpO2: 98%   Weight: 73.2 kg (161 lb 6.4 oz)   Height: 1.638 m (5' 4.5\")       Physical Exam:    Constitutional: Vital signs reviewed. Appears well-developed and well-nourished. No acute distress.   Eyes: Sclera white, conjunctivae clear.  ENT: External ears normal. External auditory canals normal without discharge. TMs translucent and non-bulging. Hearing normal. Nasal mucosa erythematous. Lips/teeth are normal. Oral mucosa pink and moist. Posterior pharynx: WNL.  Neck: Neck supple.   Cardiovascular: Regular rate and rhythm. No " murmur.  Pulmonary/Chest: Respirations non-labored. Clear to auscultation bilaterally.  Lymph: Cervical nodes without tenderness or enlargement.  Musculoskeletal: Normal gait. Normal range of motion. No muscular atrophy or weakness.  Neurological: Alert and oriented to person, place, and time. Muscle tone normal. Coordination normal.   Skin: No rashes or lesions. Warm, dry, normal turgor.  Psychiatric: Normal mood and affect. Behavior is normal. Judgment and thought content normal.       Assessment / Plan:    1. Acute respiratory infection  - amoxicillin (AMOXIL) 875 MG tablet; 1 TAB TWICE A DAY X 10 DAYS.  Dispense: 20 Tab; Refill: 0    2. Acute bronchitis, unspecified organism    3. Cough  - benzonatate (TESSALON) 200 MG capsule; 1 CAP UP TO 3 TIMES A DAY ONLY IF NEEDED FOR COUGH.  Dispense: 30 Cap; Refill: 0      Discussed with her DDX, management options, and risks, benefits, and alternatives to treatment plan agreed upon.    Agreeable to medications prescribed.    Discussed expected course of duration, time for improvement, and to seek follow-up in Emergency Room, urgent care, or with PCP if getting worse at any time or not improving within expected time frame.

## 2019-02-14 ENCOUNTER — OFFICE VISIT (OUTPATIENT)
Dept: MEDICAL GROUP | Facility: PHYSICIAN GROUP | Age: 64
End: 2019-02-14
Payer: COMMERCIAL

## 2019-02-14 VITALS
TEMPERATURE: 98.2 F | HEIGHT: 65 IN | BODY MASS INDEX: 27.32 KG/M2 | RESPIRATION RATE: 16 BRPM | DIASTOLIC BLOOD PRESSURE: 62 MMHG | WEIGHT: 164 LBS | SYSTOLIC BLOOD PRESSURE: 122 MMHG | OXYGEN SATURATION: 97 % | HEART RATE: 70 BPM

## 2019-02-14 DIAGNOSIS — K21.9 GASTROESOPHAGEAL REFLUX DISEASE, ESOPHAGITIS PRESENCE NOT SPECIFIED: ICD-10-CM

## 2019-02-14 DIAGNOSIS — Z87.19 HISTORY OF IBS: ICD-10-CM

## 2019-02-14 PROCEDURE — 99214 OFFICE O/P EST MOD 30 MIN: CPT | Performed by: NURSE PRACTITIONER

## 2019-02-14 RX ORDER — DICYCLOMINE HYDROCHLORIDE 10 MG/1
10 CAPSULE ORAL
Qty: 120 CAP | Refills: 1 | Status: SHIPPED
Start: 2019-02-14 | End: 2020-02-18

## 2019-02-14 RX ORDER — CALCIUM POLYCARBOPHIL 625 MG 625 MG/1
625 TABLET ORAL DAILY
COMMUNITY
End: 2020-10-20

## 2019-02-14 ASSESSMENT — PATIENT HEALTH QUESTIONNAIRE - PHQ9: CLINICAL INTERPRETATION OF PHQ2 SCORE: 0

## 2019-02-14 NOTE — PATIENT INSTRUCTIONS
Consider taking probiotic    Continue use of stool softeners or can try Miralax    Continue using simethicone    Avoid aggravating foods    Irritable Bowel Syndrome, Adult  Irritable bowel syndrome (IBS) is not one specific disease. It is a group of symptoms that affects the organs responsible for digestion (gastrointestinal or GI tract).  To regulate how your GI tract works, your body sends signals back and forth between your intestines and your brain. If you have IBS, there may be a problem with these signals. As a result, your GI tract does not function normally. Your intestines may become more sensitive and overreact to certain things. This is especially true when you eat certain foods or when you are under stress.  There are four types of IBS. These may be determined based on the consistency of your stool:  · IBS with diarrhea.  · IBS with constipation.  · Mixed IBS.  · Unsubtyped IBS.  It is important to know which type of IBS you have. Some treatments are more likely to be helpful for certain types of IBS.  What are the causes?  The exact cause of IBS is not known.  What increases the risk?  You may have a higher risk of IBS if:  · You are a woman.  · You are younger than 45 years old.  · You have a family history of IBS.  · You have mental health problems.  · You have had bacterial infection of your GI tract.  What are the signs or symptoms?  Symptoms of IBS vary from person to person. The main symptom is abdominal pain or discomfort. Additional symptoms usually include one or more of the following:  · Diarrhea, constipation, or both.  · Abdominal swelling or bloating.  · Feeling full or sick after eating a small or regular-size meal.  · Frequent gas.  · Mucus in the stool.  · A feeling of having more stool left after a bowel movement.  Symptoms tend to come and go. They may be associated with stress, psychiatric conditions, or nothing at all.  How is this diagnosed?  There is no specific test to diagnose  IBS. Your health care provider will make a diagnosis based on a physical exam, medical history, and your symptoms. You may have other tests to rule out other conditions that may be causing your symptoms. These may include:  · Blood tests.  · X-rays.  · CT scan.  · Endoscopy and colonoscopy. This is a test in which your GI tract is viewed with a long, thin, flexible tube.  How is this treated?  There is no cure for IBS, but treatment can help relieve symptoms. IBS treatment often includes:  · Changes to your diet, such as:  ¨ Eating more fiber.  ¨ Avoiding foods that cause symptoms.  ¨ Drinking more water.  ¨ Eating regular, medium-sized portioned meals.  · Medicines. These may include:  ¨ Fiber supplements if you have constipation.  ¨ Medicine to control diarrhea (antidiarrheal medicines).  ¨ Medicine to help control muscle spasms in your GI tract (antispasmodic medicines).  ¨ Medicines to help with any mental health issues, such as antidepressants or tranquilizers.  · Therapy.  ¨ Talk therapy may help with anxiety, depression, or other mental health issues that can make IBS symptoms worse.  · Stress reduction.  ¨ Managing your stress can help keep symptoms under control.  Follow these instructions at home:  · Take medicines only as directed by your health care provider.  · Eat a healthy diet.  ¨ Avoid foods and drinks with added sugar.  ¨ Include more whole grains, fruits, and vegetables gradually into your diet. This may be especially helpful if you have IBS with constipation.  ¨ Avoid any foods and drinks that make your symptoms worse. These may include dairy products and caffeinated or carbonated drinks.  ¨ Do not eat large meals.  ¨ Drink enough fluid to keep your urine clear or pale yellow.  · Exercise regularly. Ask your health care provider for recommendations of good activities for you.  · Keep all follow-up visits as directed by your health care provider. This is important.  Contact a health care provider  if:  · You have constant pain.  · You have trouble or pain with swallowing.  · You have worsening diarrhea.  Get help right away if:  · You have severe and worsening abdominal pain.  · You have diarrhea and:  ¨ You have a rash, stiff neck, or severe headache.  ¨ You are irritable, sleepy, or difficult to awaken.  ¨ You are weak, dizzy, or extremely thirsty.  · You have bright red blood in your stool or you have black tarry stools.  · You have unusual abdominal swelling that is painful.  · You vomit continuously.  · You vomit blood (hematemesis).  · You have both abdominal pain and a fever.  This information is not intended to replace advice given to you by your health care provider. Make sure you discuss any questions you have with your health care provider.  Document Released: 12/18/2006 Document Revised: 05/19/2017 Document Reviewed: 09/04/2015  [employer] Interactive Patient Education © 2017 [employer]

## 2019-02-14 NOTE — PROGRESS NOTES
Chief Complaint   Patient presents with   • Abdominal Pain     GERD, IBS         This is a 63 y.o.female patient that presents today with the following: Abdominal pain, discussed GERD and IBS    Gastroesophageal reflux disease  This is a chronic condition, stable and usually well controlled with daily use of PPI.  She states her symptoms have recently worsened and she eats certain foods.  She does admit to me that she does not take this on an empty stomach first thing in the morning, she will start doing so and try to continue avoiding aggravating foods and activities.  She also feels this is aggravating her IBS (see additional notes).    History of IBS  Patient reports history of IBS, diagnosed several years ago by previous PCP.  She does report a history of alternating bowel patterns from diarrhea to constipation, but as of late she has been constipation predominant.  She does take a daily stool softener which seems to help.  She is also reporting abdominal cramps especially after certain foods.  She has been using simethicone which seems to help a bit.  She was given printed education material on IBS.  She will start dicyclomine 10 mg 4 times daily, she was advised to continue using Gas-X and will consider starting a probiotic.  She is to follow-up with me in 2-3 months, sooner if needed.  Discussed with her that if symptoms do not improve with these interventions, she may need there see gastroenterology again.      No visits with results within 1 Month(s) from this visit.   Latest known visit with results is:   Hospital Outpatient Visit on 12/05/2018   Component Date Value   • TSH 12/05/2018 0.550          clinical course has been stable    Past Medical History:   Diagnosis Date   • Hyperlipidemia        Past Surgical History:   Procedure Laterality Date   • ABDOMINAL HYSTERECTOMY TOTAL     • BLADDER SLING FEMALE     • TONSILLECTOMY         History reviewed. No pertinent family history.    Sulfa drugs    Current  "Outpatient Prescriptions Ordered in [facility]   Medication Sig Dispense Refill   • calcium polycarbophil (FIBERCON) 625 MG Tab Take 625 mg by mouth every day.     • dicyclomine (BENTYL) 10 MG Cap Take 1 Cap by mouth 4 Times a Day,Before Meals and at Bedtime. 120 Cap 1   • atorvastatin (LIPITOR) 20 MG Tab TAKE 1 TABLET BY MOUTH ONCE WEEKLY 12 Tab 3   • benzonatate (TESSALON) 200 MG capsule 1 CAP UP TO 3 TIMES A DAY ONLY IF NEEDED FOR COUGH. 30 Cap 0   • Cholecalciferol (VITAMIN D3) 2000 units Tab Take  by mouth.     • Multiple Vitamins-Minerals (CENTRUM SILVER 50+WOMEN PO) Take  by mouth.     • Omega-3 Fatty Acids (FISH OIL) 1000 MG Cap capsule Take 1,000 mg by mouth 3 times a day, with meals.     • aspirin 81 MG tablet Take 81 mg by mouth every day.     • famotidine (PEPCID) 20 MG Tab Take 20 mg by mouth 2 times a day.     • D-MANNOSE PO Take  by mouth.     • conjugated estrogen (PREMARIN) 0.625 MG/GM Cream Insert 0.5 g in vagina every day.     • amoxicillin (AMOXIL) 875 MG tablet 1 TAB TWICE A DAY X 10 DAYS. 20 Tab 0     No current Epic-ordered facility-administered medications on file.        Constitutional ROS: No unexpected change in weight, No weakness, No unexplained fevers, sweats, or chills  Pulmonary ROS: No chronic cough, sputum, or hemoptysis, No shortness of breath, No recent change in breathing  Cardiovascular ROS: No chest pain  Gastrointestinal ROS: Positive per HPI  Musculoskeletal/Extremities ROS: No clubbing, No peripheral edema, No pain, redness or swelling on the joints  Neurologic ROS: Normal development, No seizures, No weakness    Physical exam:  /62 (BP Location: Right arm, Patient Position: Sitting, BP Cuff Size: Adult)   Pulse 70   Temp 36.8 °C (98.2 °F) (Temporal)   Resp 16   Ht 1.638 m (5' 4.5\")   Wt 74.4 kg (164 lb)   SpO2 97%   BMI 27.72 kg/m²   General Appearance: Very pleasant older female, alert, no distress, moderately overweight, well-groomed  Skin: Skin color, texture, " turgor normal. No rashes or lesions.  Lungs: negative findings: normal respiratory rate and rhythm, normal effort  Abdomen: positive findings:  tenderness mild generalized but mostly in epigastric region.  Negative findings: Abdomen soft, positive bowel sounds.  Musculoskeletal: negative findings: no evidence of joint instability, no evidence of muscle atrophy, no deformities present  Neurologic: intact    Medical decision making/discussion: Patient will start dicyclomine as mentioned above.  She was given printed education material on IBS, advised to continue with keeping stools soft and avoiding aggravating foods and activities.  She is to consider taking probiotic.  She was also reminded to take PPI on empty stomach first thing in the morning.  She is to follow-up with me in 3 months.    Xiomara was seen today for abdominal pain.    Diagnoses and all orders for this visit:    Gastroesophageal reflux disease, esophagitis presence not specified    History of IBS  -     dicyclomine (BENTYL) 10 MG Cap; Take 1 Cap by mouth 4 Times a Day,Before Meals and at Bedtime.          Please note that this dictation was created using voice recognition software. I have made every reasonable attempt to correct obvious errors, but I expect that there are errors of grammar and possibly content that I did not discover before finalizing the note.

## 2019-02-17 NOTE — ASSESSMENT & PLAN NOTE
This is a chronic condition, stable and usually well controlled with daily use of PPI.  She states her symptoms have recently worsened and she eats certain foods.  She does admit to me that she does not take this on an empty stomach first thing in the morning, she will start doing so and try to continue avoiding aggravating foods and activities.  She also feels this is aggravating her IBS (see additional notes).

## 2019-02-17 NOTE — ASSESSMENT & PLAN NOTE
Patient reports history of IBS, diagnosed several years ago by previous PCP.  She does report a history of alternating bowel patterns from diarrhea to constipation, but as of late she has been constipation predominant.  She does take a daily stool softener which seems to help.  She is also reporting abdominal cramps especially after certain foods.  She has been using simethicone which seems to help a bit.  She was given printed education material on IBS.  She will start dicyclomine 10 mg 4 times daily, she was advised to continue using Gas-X and will consider starting a probiotic.  She is to follow-up with me in 2-3 months, sooner if needed.  Discussed with her that if symptoms do not improve with these interventions, she may need there see gastroenterology again.

## 2019-06-11 ENCOUNTER — OFFICE VISIT (OUTPATIENT)
Dept: URGENT CARE | Facility: PHYSICIAN GROUP | Age: 64
End: 2019-06-11
Payer: COMMERCIAL

## 2019-06-11 ENCOUNTER — HOSPITAL ENCOUNTER (OUTPATIENT)
Facility: MEDICAL CENTER | Age: 64
End: 2019-06-11
Attending: FAMILY MEDICINE
Payer: COMMERCIAL

## 2019-06-11 VITALS
RESPIRATION RATE: 14 BRPM | WEIGHT: 159 LBS | TEMPERATURE: 97.9 F | SYSTOLIC BLOOD PRESSURE: 128 MMHG | OXYGEN SATURATION: 97 % | BODY MASS INDEX: 26.87 KG/M2 | DIASTOLIC BLOOD PRESSURE: 86 MMHG | HEART RATE: 110 BPM

## 2019-06-11 DIAGNOSIS — R10.9 ABDOMINAL PAIN, UNSPECIFIED ABDOMINAL LOCATION: ICD-10-CM

## 2019-06-11 DIAGNOSIS — N39.0 ACUTE URINARY TRACT INFECTION: ICD-10-CM

## 2019-06-11 LAB
APPEARANCE UR: NORMAL
BILIRUB UR STRIP-MCNC: NORMAL MG/DL
COLOR UR AUTO: YELLOW
GLUCOSE UR STRIP.AUTO-MCNC: NORMAL MG/DL
KETONES UR STRIP.AUTO-MCNC: NORMAL MG/DL
LEUKOCYTE ESTERASE UR QL STRIP.AUTO: NORMAL
NITRITE UR QL STRIP.AUTO: NORMAL
PH UR STRIP.AUTO: 5.5 [PH] (ref 5–8)
PROT UR QL STRIP: NORMAL MG/DL
RBC UR QL AUTO: NORMAL
SP GR UR STRIP.AUTO: 1.01
UROBILINOGEN UR STRIP-MCNC: 0.2 MG/DL

## 2019-06-11 PROCEDURE — 99214 OFFICE O/P EST MOD 30 MIN: CPT | Performed by: FAMILY MEDICINE

## 2019-06-11 PROCEDURE — 81002 URINALYSIS NONAUTO W/O SCOPE: CPT | Performed by: FAMILY MEDICINE

## 2019-06-11 PROCEDURE — 87086 URINE CULTURE/COLONY COUNT: CPT

## 2019-06-11 RX ORDER — CIPROFLOXACIN 500 MG/1
500 TABLET, FILM COATED ORAL EVERY 12 HOURS
Qty: 10 TAB | Refills: 0 | Status: SHIPPED | OUTPATIENT
Start: 2019-06-11 | End: 2019-06-13

## 2019-06-11 NOTE — PROGRESS NOTES
Chief Complaint:    Chief Complaint   Patient presents with   • Back Pain     lower back pain   • Abdominal Pain     lower abd pain       History of Present Illness:    This is a new problem. Today she started with pain in bladder region. She has had urinary frequency and urinary urgency. No dysuria. Yesterday, she started with some pain across lower back. There was nothing she can think of that she did physically to cause the back pain. However, she reports she has been doing some weeding, but she does this with some regularity. She reports she does not always have the classic symptoms for UTI when she does have a UTI. Last positive urine culture was 2/12/19 - in scanned in under Media tab. This grew Klebsiella Oxytoca which Nitrofurantoin was intermediate. She was treated with Cipro for this episode. No problems with Cipro med.      Review of Systems:    Constitutional: Negative for fever, chills, and diaphoresis.   Eyes: Negative for change in vision, photophobia, pain, redness, and discharge.  ENT: Negative for ear pain, ear discharge, hearing loss, tinnitus, nasal congestion, nosebleeds, and sore throat.    Respiratory: Negative for cough, hemoptysis, sputum production, shortness of breath, wheezing, and stridor.    Cardiovascular: Negative for chest pain, palpitations, orthopnea, claudication, leg swelling, and PND.   Gastrointestinal: See HPI.  Genitourinary: See HPI.  Musculoskeletal: See HPI.  Skin: Negative for rash and itching.   Neurological: Negative for dizziness, tingling, tremors, sensory change, speech change, focal weakness, seizures, loss of consciousness, and headaches.   Endo: Negative for polydipsia.   Heme: Does not bruise/bleed easily.   Psychiatric/Behavioral: Negative for depression, suicidal ideas, hallucinations, memory loss and substance abuse. The patient is not nervous/anxious and does not have insomnia.        Past Medical History:    Past Medical History:   Diagnosis Date   •  Hyperlipidemia      Past Surgical History:    Past Surgical History:   Procedure Laterality Date   • ABDOMINAL HYSTERECTOMY TOTAL     • BLADDER SLING FEMALE     • TONSILLECTOMY       Social History:    Social History     Social History   • Marital status:      Spouse name: N/A   • Number of children: N/A   • Years of education: N/A     Occupational History   • Not on file.     Social History Main Topics   • Smoking status: Never Smoker   • Smokeless tobacco: Never Used   • Alcohol use No   • Drug use: No   • Sexual activity: Not on file     Other Topics Concern   • Not on file     Social History Narrative   • No narrative on file     Family History:    History reviewed. No pertinent family history.    Medications:    Current Outpatient Prescriptions on File Prior to Visit   Medication Sig Dispense Refill   • calcium polycarbophil (FIBERCON) 625 MG Tab Take 625 mg by mouth every day.     • dicyclomine (BENTYL) 10 MG Cap Take 1 Cap by mouth 4 Times a Day,Before Meals and at Bedtime. 120 Cap 1   • atorvastatin (LIPITOR) 20 MG Tab TAKE 1 TABLET BY MOUTH ONCE WEEKLY 12 Tab 3   • Cholecalciferol (VITAMIN D3) 2000 units Tab Take  by mouth.     • Multiple Vitamins-Minerals (CENTRUM SILVER 50+WOMEN PO) Take  by mouth.     • Omega-3 Fatty Acids (FISH OIL) 1000 MG Cap capsule Take 1,000 mg by mouth 3 times a day, with meals.     • aspirin 81 MG tablet Take 81 mg by mouth every day.     • famotidine (PEPCID) 20 MG Tab Take 20 mg by mouth 2 times a day.     • D-MANNOSE PO Take  by mouth.     • conjugated estrogen (PREMARIN) 0.625 MG/GM Cream Insert 0.5 g in vagina every day.       No current facility-administered medications on file prior to visit.      Allergies:    Allergies   Allergen Reactions   • Sulfa Drugs Rash and Vomiting       Vitals:    Vitals:    06/11/19 1002   BP: 128/86   Pulse: (!) 110   Resp: 14   Temp: 36.6 °C (97.9 °F)   TempSrc: Temporal   SpO2: 97%   Weight: 72.1 kg (159 lb)       Physical  Exam:    Constitutional: Vital signs reviewed. Appears well-developed and well-nourished. No acute distress.   Eyes: Sclera white, conjunctivae clear.   ENT: External ears normal. Hearing normal.  Neck: Neck supple.   Pulmonary/Chest: Respirations non-labored.   Abdomen: Mildly tender to palpation over bladder region. Bowel sounds are normal active. Soft, non-distended.  Musculoskeletal: No CVA TTP bilaterally. Normal gait. Normal range of motion. No tenderness to palpation. No muscular atrophy or weakness.  Neurological: Alert and oriented to person, place, and time. Muscle tone normal. Coordination normal. Light touch and sensation normal.   Skin: No rashes or lesions. Warm, dry, normal turgor.  Psychiatric: Normal mood and affect. Behavior is normal. Judgment and thought content normal.     Diagnostics:    POCT URINALYSIS (Order #[id number]) on 6/11/19   Component Results     Component Value Ref Range & Units Status   POC Color Yellow  Negative Final   POC Appearance Sloghtly cloudy  Negative Final   POC Leukocyte Esterase Trace  Negative Final   Comment:   Machine reads Negative/ Per Dr. Singer small/trace was seen   POC Nitrites Neg  Negative Final   POC Urobiligen 0.2  Negative (0.2) mg/dL Final   POC Protein Neg  Negative mg/dL Final   POC Urine PH 5.5  5.0 - 8.0 Final   POC Blood Trace  Negative Final   POC Specific Gravity 1.015  <1.005 - >1.030 Final   POC Ketones Neg  Negative mg/dL Final   POC Bilirubin Neg  Negative mg/dL Final   POC Glucose Neg  Negative mg/dL Final   Last Resulted Time   Tue Jun 11, 2019 10:17 AM     Machine read leukocytes as negative, but visual inspection shows it clearly is not negative, it was at least trace to small.      Assessment / Plan:    1. Abdominal pain, unspecified abdominal location  - POCT Urinalysis    2. Acute urinary tract infection  - ciprofloxacin (CIPRO) 500 MG Tab; Take 1 Tab by mouth every 12 hours for 5 days.  Dispense: 10 Tab; Refill: 0  - URINE CULTURE(NEW);  Future      Discussed with her DDX, management options, and risks, benefits, and alternatives to treatment plan agreed upon.    Agreeable to treat for UTI.    Agreeable to medication prescribed and send urine for culture.    Advised urine culture result will show in MyChart in a few days.    Discussed expected course of duration, time for improvement, and to seek follow-up in Emergency Room, urgent care, or with PCP if getting worse at any time or not improving within expected time frame.

## 2019-06-13 ENCOUNTER — OFFICE VISIT (OUTPATIENT)
Dept: URGENT CARE | Facility: PHYSICIAN GROUP | Age: 64
End: 2019-06-13
Payer: COMMERCIAL

## 2019-06-13 VITALS
BODY MASS INDEX: 26.03 KG/M2 | SYSTOLIC BLOOD PRESSURE: 142 MMHG | RESPIRATION RATE: 16 BRPM | HEART RATE: 99 BPM | TEMPERATURE: 98.4 F | OXYGEN SATURATION: 95 % | DIASTOLIC BLOOD PRESSURE: 94 MMHG | WEIGHT: 154 LBS

## 2019-06-13 DIAGNOSIS — Z87.448 HISTORY OF HEMATURIA: ICD-10-CM

## 2019-06-13 DIAGNOSIS — R31.9 HEMATURIA, UNSPECIFIED TYPE: ICD-10-CM

## 2019-06-13 DIAGNOSIS — R10.9 RIGHT FLANK PAIN: ICD-10-CM

## 2019-06-13 LAB
APPEARANCE UR: CLEAR
BACTERIA UR CULT: NORMAL
BILIRUB UR STRIP-MCNC: NEGATIVE MG/DL
COLOR UR AUTO: YELLOW
GLUCOSE UR STRIP.AUTO-MCNC: NEGATIVE MG/DL
KETONES UR STRIP.AUTO-MCNC: 15 MG/DL
LEUKOCYTE ESTERASE UR QL STRIP.AUTO: NORMAL
NITRITE UR QL STRIP.AUTO: NEGATIVE
PH UR STRIP.AUTO: 5.5 [PH] (ref 5–8)
PROT UR QL STRIP: NEGATIVE MG/DL
RBC UR QL AUTO: NORMAL
SIGNIFICANT IND 70042: NORMAL
SITE SITE: NORMAL
SOURCE SOURCE: NORMAL
SP GR UR STRIP.AUTO: >1.03
UROBILINOGEN UR STRIP-MCNC: 0.2 MG/DL

## 2019-06-13 PROCEDURE — 81002 URINALYSIS NONAUTO W/O SCOPE: CPT | Performed by: FAMILY MEDICINE

## 2019-06-13 PROCEDURE — 99214 OFFICE O/P EST MOD 30 MIN: CPT | Performed by: FAMILY MEDICINE

## 2019-06-13 NOTE — PROGRESS NOTES
"Chief Complaint:    Chief Complaint   Patient presents with   • Back Pain     recent visit to UC-pt is not feeling better from previous visit        History of Present Illness:    She was seen here on 6/11/19 and rx'd Cipro 500 mg BID x 5 days for possible UTI. However, urine culture only grew \"Mixed skin jennyfer 10-50,000 cfu/mL\". She still has persisting discomfort. Most recently, she is feeling the discomfort in the right flank and it feels deep inside. Feels like an ache currently. Sometimes it has felt tingly. She has not had shingles in the past. She has had the shingles vaccine. She was having urinary frequency and urinary urgency, these symptoms are not too bothersome currently. No dysuria.      Review of Systems:    Constitutional: Negative for fever, chills, and diaphoresis.   Eyes: Negative for change in vision, photophobia, pain, redness, and discharge.  ENT: Negative for ear pain, ear discharge, hearing loss, tinnitus, nasal congestion, nosebleeds, and sore throat.    Respiratory: Negative for cough, hemoptysis, sputum production, shortness of breath, wheezing, and stridor.    Cardiovascular: Negative for chest pain, palpitations, orthopnea, claudication, leg swelling, and PND.   Gastrointestinal: Negative for abdominal pain, nausea, vomiting, diarrhea, constipation, blood in stool, and melena.   Genitourinary: See HPI.  Musculoskeletal: See HPI.  Skin: Negative for rash and itching.   Neurological: Negative for dizziness, tingling, tremors, sensory change, speech change, focal weakness, seizures, loss of consciousness, and headaches.   Endo: Negative for polydipsia.   Heme: Does not bruise/bleed easily.   Psychiatric/Behavioral: Negative for depression, suicidal ideas, hallucinations, memory loss and substance abuse. The patient is not nervous/anxious and does not have insomnia.        Past Medical History:    Past Medical History:   Diagnosis Date   • Hyperlipidemia      Past Surgical History:    Past " Surgical History:   Procedure Laterality Date   • ABDOMINAL HYSTERECTOMY TOTAL     • BLADDER SLING FEMALE     • TONSILLECTOMY       Social History:    Social History     Social History   • Marital status:      Spouse name: N/A   • Number of children: N/A   • Years of education: N/A     Occupational History   • Not on file.     Social History Main Topics   • Smoking status: Never Smoker   • Smokeless tobacco: Never Used   • Alcohol use No   • Drug use: No   • Sexual activity: Not on file     Other Topics Concern   • Not on file     Social History Narrative   • No narrative on file     Family History:    History reviewed. No pertinent family history.    Medications:    Current Outpatient Prescriptions on File Prior to Visit   Medication Sig Dispense Refill   • ciprofloxacin (CIPRO) 500 MG Tab Take 1 Tab by mouth every 12 hours for 5 days. 10 Tab 0   • calcium polycarbophil (FIBERCON) 625 MG Tab Take 625 mg by mouth every day.     • dicyclomine (BENTYL) 10 MG Cap Take 1 Cap by mouth 4 Times a Day,Before Meals and at Bedtime. 120 Cap 1   • atorvastatin (LIPITOR) 20 MG Tab TAKE 1 TABLET BY MOUTH ONCE WEEKLY 12 Tab 3   • Cholecalciferol (VITAMIN D3) 2000 units Tab Take  by mouth.     • Multiple Vitamins-Minerals (CENTRUM SILVER 50+WOMEN PO) Take  by mouth.     • Omega-3 Fatty Acids (FISH OIL) 1000 MG Cap capsule Take 1,000 mg by mouth 3 times a day, with meals.     • aspirin 81 MG tablet Take 81 mg by mouth every day.     • famotidine (PEPCID) 20 MG Tab Take 20 mg by mouth 2 times a day.     • D-MANNOSE PO Take  by mouth.     • conjugated estrogen (PREMARIN) 0.625 MG/GM Cream Insert 0.5 g in vagina every day.       No current facility-administered medications on file prior to visit.      Allergies:    Allergies   Allergen Reactions   • Sulfa Drugs Rash and Vomiting     Pt has taken sulfa medications with no rxn       Vitals:    Vitals:    06/13/19 1404   BP: 142/94   Pulse: 99   Resp: 16   Temp: 36.9 °C (98.4 °F)    SpO2: 95%   Weight: 69.9 kg (154 lb)       Physical Exam:    Constitutional: Vital signs reviewed. Appears well-developed and well-nourished. No acute distress.   Eyes: Sclera white, conjunctivae clear.   ENT: External ears normal. Hearing normal.  Neck: Neck supple.   Pulmonary/Chest: Respirations non-labored.   Abdomen: Bowel sounds are normal active. Soft, non-distended, and non-tender to palpation.  Musculoskeletal: No CVA TTP bilaterally. Normal gait. Normal range of motion. No tenderness to palpation. No muscular atrophy or weakness.  Neurological: Alert and oriented to person, place, and time. Muscle tone normal. Coordination normal. Light touch and sensation normal.   Skin: No rashes or lesions. Warm, dry, normal turgor.  Psychiatric: Normal mood and affect. Behavior is normal. Judgment and thought content normal.       Diagnostics:    POCT URINALYSIS (Order #[id number]) on 6/13/19   Component Results     Component Value Ref Range & Units Status   POC Color yellow  Negative Final   POC Appearance clear  Negative Final   POC Leukocyte Esterase trace  Negative Final   POC Nitrites negative  Negative Final   POC Urobiligen 0.2  Negative (0.2) mg/dL Final   POC Protein negative  Negative mg/dL Final   POC Urine PH 5.5  5.0 - 8.0 Final   POC Blood small  Negative Final   POC Specific Gravity >1.030  <1.005 - >1.030 Final   POC Ketones 15  Negative mg/dL Final   POC Bilirubin negative  Negative mg/dL Final   POC Glucose negative  Negative mg/dL Final   Last Resulted Time   Thu Jun 13, 2019  2:49 PM       Assessment / Plan:    1. Right flank pain  - POCT Urinalysis  - CT-RENAL COLIC EVALUATION(A/P W/O); Future    2. History of hematuria  - POCT Urinalysis    3. Hematuria, unspecified type  - CT-RENAL COLIC EVALUATION(A/P W/O); Future      Discussed with her DDX, management options, and risks, benefits, and alternatives to treatment plan agreed upon.    Today urine dipstick showed trace leukocytes (machine read  leukocytes as negative on 6/11/19, but visually looked trace, so was recorded as trace) and small blood (was trace on 6/11/19).     I do not think she has a urine infection since urine culture (6/11/19) only grew: Mixed skin jennyfer 10-50,000 cfu/mL and she did not improve with Cipro rx'd 6/11/19. I advised her to stop Cipro.    Since urine dipstick is worse today compared to 6/11/19, advised she may have kidney/ureter stone as cause of symptoms and urine dipstick findings.    Agreeable to CT Renal Colic ordered for evaluation - this was scheduled for 6 PM today. Will notify her of results.

## 2019-06-14 ENCOUNTER — HOSPITAL ENCOUNTER (OUTPATIENT)
Dept: RADIOLOGY | Facility: MEDICAL CENTER | Age: 64
End: 2019-06-14
Attending: FAMILY MEDICINE
Payer: COMMERCIAL

## 2019-06-14 DIAGNOSIS — R10.9 RIGHT FLANK PAIN: ICD-10-CM

## 2019-06-14 DIAGNOSIS — R31.9 HEMATURIA, UNSPECIFIED TYPE: ICD-10-CM

## 2019-06-14 PROCEDURE — 74176 CT ABD & PELVIS W/O CONTRAST: CPT

## 2019-06-15 DIAGNOSIS — N20.0 RENAL CALCULUS, RIGHT: ICD-10-CM

## 2019-06-15 RX ORDER — TAMSULOSIN HYDROCHLORIDE 0.4 MG/1
0.4 CAPSULE ORAL DAILY
Qty: 30 CAP | Refills: 0 | Status: SHIPPED | OUTPATIENT
Start: 2019-06-15 | End: 2020-10-20

## 2019-07-31 ENCOUNTER — OFFICE VISIT (OUTPATIENT)
Dept: MEDICAL GROUP | Facility: PHYSICIAN GROUP | Age: 64
End: 2019-07-31
Payer: COMMERCIAL

## 2019-07-31 VITALS
OXYGEN SATURATION: 96 % | RESPIRATION RATE: 12 BRPM | DIASTOLIC BLOOD PRESSURE: 78 MMHG | WEIGHT: 156 LBS | TEMPERATURE: 98.1 F | SYSTOLIC BLOOD PRESSURE: 126 MMHG | BODY MASS INDEX: 25.99 KG/M2 | HEART RATE: 94 BPM | HEIGHT: 65 IN

## 2019-07-31 DIAGNOSIS — Z87.19 HISTORY OF IBS: ICD-10-CM

## 2019-07-31 DIAGNOSIS — K92.1 BLOOD IN STOOL: ICD-10-CM

## 2019-07-31 PROCEDURE — 99213 OFFICE O/P EST LOW 20 MIN: CPT | Performed by: NURSE PRACTITIONER

## 2019-07-31 ASSESSMENT — PAIN SCALES - GENERAL: PAINLEVEL: NO PAIN

## 2019-07-31 NOTE — PROGRESS NOTES
Chief Complaint   Patient presents with   • Irritable Bowel Syndrome         This is a 63 y.o.female patient that presents today with the following: Follow-up IBS, blood in stool    History of IBS  This is chronic, predominately constipation. She has noticed more episodes or blood in her stool.  This may be also multifactorial she does have history of rectocele and cystocele and thinks this might be contributing to her stool issues, she is going to follow-up with her OB/GYN regarding this.  She does agree to referral to gastroenterology for further evaluation of blood in stool as well as her IBS.    Blood in stool  See additional notes      No visits with results within 1 Month(s) from this visit.   Latest known visit with results is:   Office Visit on 06/13/2019   Component Date Value   • POC Color 06/13/2019 yellow    • POC Appearance 06/13/2019 clear    • POC Leukocyte Esterase 06/13/2019 trace    • POC Nitrites 06/13/2019 negative    • POC Urobiligen 06/13/2019 0.2    • POC Protein 06/13/2019 negative    • POC Urine PH 06/13/2019 5.5    • POC Blood 06/13/2019 small    • POC Specific Gravity 06/13/2019 >1.030    • POC Ketones 06/13/2019 15    • POC Bilirubin 06/13/2019 negative    • POC Glucose 06/13/2019 negative          clinical course has been stable    Past Medical History:   Diagnosis Date   • Hyperlipidemia        Past Surgical History:   Procedure Laterality Date   • ABDOMINAL HYSTERECTOMY TOTAL     • BLADDER SLING FEMALE     • TONSILLECTOMY         No family history on file.    Sulfa drugs    Current Outpatient Medications Ordered in Epic   Medication Sig Dispense Refill   • tamsulosin (FLOMAX) 0.4 MG capsule Take 1 Cap by mouth every day. Take until kidney stone passes. 30 Cap 0   • calcium polycarbophil (FIBERCON) 625 MG Tab Take 625 mg by mouth every day.     • atorvastatin (LIPITOR) 20 MG Tab TAKE 1 TABLET BY MOUTH ONCE WEEKLY 12 Tab 3   • Cholecalciferol (VITAMIN D3) 2000 units Tab Take  by mouth.    "  • Multiple Vitamins-Minerals (CENTRUM SILVER 50+WOMEN PO) Take  by mouth.     • Omega-3 Fatty Acids (FISH OIL) 1000 MG Cap capsule Take 1,000 mg by mouth 3 times a day, with meals.     • aspirin 81 MG tablet Take 81 mg by mouth every day.     • famotidine (PEPCID) 20 MG Tab Take 20 mg by mouth 2 times a day.     • conjugated estrogen (PREMARIN) 0.625 MG/GM Cream Insert 0.5 g in vagina every day.     • dicyclomine (BENTYL) 10 MG Cap Take 1 Cap by mouth 4 Times a Day,Before Meals and at Bedtime. (Patient not taking: Reported on 7/31/2019) 120 Cap 1   • D-MANNOSE PO Take  by mouth.       No current Epic-ordered facility-administered medications on file.        Constitutional ROS: No unexpected change in weight, No weakness, No unexplained fevers, sweats, or chills  Pulmonary ROS: No chronic cough, sputum, or hemoptysis, No shortness of breath, No recent change in breathing  Cardiovascular ROS: No chest pain  Gastrointestinal ROS: Positive per HPI  Musculoskeletal/Extremities ROS: No clubbing, No peripheral edema, No pain, redness or swelling on the joints  Neurologic ROS: Normal development, No seizures, No weakness    Physical exam:  /78 (BP Location: Right arm, Patient Position: Sitting, BP Cuff Size: Adult)   Pulse 94   Temp 36.7 °C (98.1 °F) (Temporal)   Resp 12   Ht 1.638 m (5' 4.5\")   Wt 70.8 kg (156 lb)   SpO2 96%   Breastfeeding? No   BMI 26.36 kg/m²   General Appearance: Very pleasant older female, alert, no distress, mildly overweight, well-groomed  Skin: Skin color, texture, turgor normal. No rashes or lesions.  Lungs: negative findings: normal respiratory rate and rhythm, lungs clear to auscultation  Musculoskeletal: negative findings: no evidence of joint instability, no evidence of muscle atrophy, no deformities present  Neurologic: intact, CN II through XII grossly intact    Medical decision making/discussion: Patient will be referred to gastroenterology for further evaluation of blood in " stool as well as her IBS.  She is going to consult with GYN regarding her history of rectocele and cystocele as this may be part of the problem.  She is to follow-up with me in January with her labs done in December as previously ordered.    Xiomara was seen today for irritable bowel syndrome.    Diagnoses and all orders for this visit:    History of IBS  -     REFERRAL TO GASTROENTEROLOGY    Blood in stool  -     REFERRAL TO GASTROENTEROLOGY        Return in about 6 months (around 1/31/2020) for Follow-up, Discuss Labs.        Please note that this dictation was created using voice recognition software. I have made every reasonable attempt to correct obvious errors, but I expect that there are errors of grammar and possibly content that I did not discover before finalizing the note.      Patient is

## 2019-07-31 NOTE — ASSESSMENT & PLAN NOTE
This is chronic, predominately constipation. She has noticed more episodes or blood in her stool.  This may be also multifactorial she does have history of rectocele and cystocele and thinks this might be contributing to her stool issues, she is going to follow-up with her OB/GYN regarding this.  She does agree to referral to gastroenterology for further evaluation of blood in stool as well as her IBS.

## 2019-07-31 NOTE — PATIENT INSTRUCTIONS
Have labs done before 12/10/19    Will refer to GI for further eval of blood in stool    Consults with GYN regarding rectocele and cystocele

## 2019-10-18 ENCOUNTER — HOSPITAL ENCOUNTER (OUTPATIENT)
Dept: LAB | Facility: MEDICAL CENTER | Age: 64
End: 2019-10-18
Attending: INTERNAL MEDICINE
Payer: MEDICARE

## 2019-10-18 ENCOUNTER — HOSPITAL ENCOUNTER (OUTPATIENT)
Dept: LAB | Facility: MEDICAL CENTER | Age: 64
End: 2019-10-18
Attending: NURSE PRACTITIONER
Payer: MEDICARE

## 2019-10-18 DIAGNOSIS — E55.9 VITAMIN D INSUFFICIENCY: ICD-10-CM

## 2019-10-18 DIAGNOSIS — E78.2 MIXED HYPERLIPIDEMIA: ICD-10-CM

## 2019-10-18 DIAGNOSIS — E04.1 THYROID NODULE: ICD-10-CM

## 2019-10-18 LAB
ALBUMIN SERPL BCP-MCNC: 4.3 G/DL (ref 3.2–4.9)
ALBUMIN SERPL BCP-MCNC: 4.4 G/DL (ref 3.2–4.9)
ALBUMIN/GLOB SERPL: 1.5 G/DL
ALBUMIN/GLOB SERPL: 1.8 G/DL
ALP SERPL-CCNC: 85 U/L (ref 30–99)
ALP SERPL-CCNC: 86 U/L (ref 30–99)
ALT SERPL-CCNC: 19 U/L (ref 2–50)
ALT SERPL-CCNC: 20 U/L (ref 2–50)
ANION GAP SERPL CALC-SCNC: 5 MMOL/L (ref 0–11.9)
ANION GAP SERPL CALC-SCNC: 7 MMOL/L (ref 0–11.9)
AST SERPL-CCNC: 19 U/L (ref 12–45)
AST SERPL-CCNC: 20 U/L (ref 12–45)
BASOPHILS # BLD AUTO: 1 % (ref 0–1.8)
BASOPHILS # BLD: 0.06 K/UL (ref 0–0.12)
BILIRUB SERPL-MCNC: 0.7 MG/DL (ref 0.1–1.5)
BILIRUB SERPL-MCNC: 0.7 MG/DL (ref 0.1–1.5)
BUN SERPL-MCNC: 10 MG/DL (ref 8–22)
BUN SERPL-MCNC: 10 MG/DL (ref 8–22)
CALCIUM SERPL-MCNC: 10 MG/DL (ref 8.5–10.5)
CALCIUM SERPL-MCNC: 10 MG/DL (ref 8.5–10.5)
CHLORIDE SERPL-SCNC: 107 MMOL/L (ref 96–112)
CHLORIDE SERPL-SCNC: 107 MMOL/L (ref 96–112)
CHOLEST SERPL-MCNC: 209 MG/DL (ref 100–199)
CO2 SERPL-SCNC: 27 MMOL/L (ref 20–33)
CO2 SERPL-SCNC: 28 MMOL/L (ref 20–33)
CREAT SERPL-MCNC: 0.74 MG/DL (ref 0.5–1.4)
CREAT SERPL-MCNC: 0.79 MG/DL (ref 0.5–1.4)
EOSINOPHIL # BLD AUTO: 0.04 K/UL (ref 0–0.51)
EOSINOPHIL NFR BLD: 0.7 % (ref 0–6.9)
ERYTHROCYTE [DISTWIDTH] IN BLOOD BY AUTOMATED COUNT: 44.5 FL (ref 35.9–50)
FASTING STATUS PATIENT QL REPORTED: NORMAL
GLOBULIN SER CALC-MCNC: 2.4 G/DL (ref 1.9–3.5)
GLOBULIN SER CALC-MCNC: 2.8 G/DL (ref 1.9–3.5)
GLUCOSE SERPL-MCNC: 91 MG/DL (ref 65–99)
GLUCOSE SERPL-MCNC: 94 MG/DL (ref 65–99)
HCT VFR BLD AUTO: 47.4 % (ref 37–47)
HDLC SERPL-MCNC: 58 MG/DL
HGB BLD-MCNC: 14.9 G/DL (ref 12–16)
IMM GRANULOCYTES # BLD AUTO: 0.01 K/UL (ref 0–0.11)
IMM GRANULOCYTES NFR BLD AUTO: 0.2 % (ref 0–0.9)
LDLC SERPL CALC-MCNC: 118 MG/DL
LYMPHOCYTES # BLD AUTO: 2.09 K/UL (ref 1–4.8)
LYMPHOCYTES NFR BLD: 34.8 % (ref 22–41)
MCH RBC QN AUTO: 28.9 PG (ref 27–33)
MCHC RBC AUTO-ENTMCNC: 31.4 G/DL (ref 33.6–35)
MCV RBC AUTO: 91.9 FL (ref 81.4–97.8)
MONOCYTES # BLD AUTO: 0.41 K/UL (ref 0–0.85)
MONOCYTES NFR BLD AUTO: 6.8 % (ref 0–13.4)
NEUTROPHILS # BLD AUTO: 3.4 K/UL (ref 2–7.15)
NEUTROPHILS NFR BLD: 56.5 % (ref 44–72)
NRBC # BLD AUTO: 0 K/UL
NRBC BLD-RTO: 0 /100 WBC
PLATELET # BLD AUTO: 245 K/UL (ref 164–446)
PMV BLD AUTO: 11 FL (ref 9–12.9)
POTASSIUM SERPL-SCNC: 4.2 MMOL/L (ref 3.6–5.5)
POTASSIUM SERPL-SCNC: 4.3 MMOL/L (ref 3.6–5.5)
PROT SERPL-MCNC: 6.8 G/DL (ref 6–8.2)
PROT SERPL-MCNC: 7.1 G/DL (ref 6–8.2)
RBC # BLD AUTO: 5.16 M/UL (ref 4.2–5.4)
SODIUM SERPL-SCNC: 140 MMOL/L (ref 135–145)
SODIUM SERPL-SCNC: 141 MMOL/L (ref 135–145)
TRIGL SERPL-MCNC: 165 MG/DL (ref 0–149)
WBC # BLD AUTO: 6 K/UL (ref 4.8–10.8)

## 2019-10-18 PROCEDURE — 80053 COMPREHEN METABOLIC PANEL: CPT | Mod: 91

## 2019-10-18 PROCEDURE — 80061 LIPID PANEL: CPT

## 2019-10-18 PROCEDURE — 36415 COLL VENOUS BLD VENIPUNCTURE: CPT

## 2019-10-18 PROCEDURE — 80053 COMPREHEN METABOLIC PANEL: CPT

## 2019-10-18 PROCEDURE — 82306 VITAMIN D 25 HYDROXY: CPT

## 2019-10-18 PROCEDURE — 85025 COMPLETE CBC W/AUTO DIFF WBC: CPT

## 2019-10-18 PROCEDURE — 84443 ASSAY THYROID STIM HORMONE: CPT

## 2019-10-19 LAB
25(OH)D3 SERPL-MCNC: 32 NG/ML (ref 30–100)
TSH SERPL DL<=0.005 MIU/L-ACNC: 0.4 UIU/ML (ref 0.38–5.33)

## 2019-10-22 ENCOUNTER — TELEPHONE (OUTPATIENT)
Dept: MEDICAL GROUP | Facility: PHYSICIAN GROUP | Age: 64
End: 2019-10-22

## 2019-10-22 DIAGNOSIS — E04.1 THYROID NODULE: ICD-10-CM

## 2019-11-12 ENCOUNTER — HOSPITAL ENCOUNTER (OUTPATIENT)
Dept: RADIOLOGY | Facility: MEDICAL CENTER | Age: 64
End: 2019-11-12
Attending: NURSE PRACTITIONER
Payer: MEDICARE

## 2019-11-12 DIAGNOSIS — E04.1 THYROID NODULE: ICD-10-CM

## 2019-11-12 PROCEDURE — 76536 US EXAM OF HEAD AND NECK: CPT

## 2020-02-03 DIAGNOSIS — E78.2 MIXED HYPERLIPIDEMIA: ICD-10-CM

## 2020-02-05 RX ORDER — ATORVASTATIN CALCIUM 20 MG/1
TABLET, FILM COATED ORAL
Qty: 12 TAB | Refills: 0 | Status: SHIPPED | OUTPATIENT
Start: 2020-02-05 | End: 2020-05-07

## 2020-02-05 NOTE — TELEPHONE ENCOUNTER
Was the patient seen in the last year in this department? Yes    Does patient have an active prescription for medications requested? No     Received Request Via: Pharmacy      Pt met protocol?: Yes    OV 7/19     Lab Results   Component Value Date/Time    CHOLSTRLTOT 209 (H) 10/18/2019 1001    TRIGLYCERIDE 165 (H) 10/18/2019 1001    HDL 58 10/18/2019 1001     (H) 10/18/2019 1001

## 2020-02-18 ENCOUNTER — OFFICE VISIT (OUTPATIENT)
Dept: MEDICAL GROUP | Facility: PHYSICIAN GROUP | Age: 65
End: 2020-02-18
Payer: MEDICARE

## 2020-02-18 VITALS
TEMPERATURE: 97.8 F | SYSTOLIC BLOOD PRESSURE: 132 MMHG | DIASTOLIC BLOOD PRESSURE: 84 MMHG | HEART RATE: 91 BPM | RESPIRATION RATE: 14 BRPM | WEIGHT: 165 LBS | OXYGEN SATURATION: 97 % | BODY MASS INDEX: 27.49 KG/M2 | HEIGHT: 65 IN

## 2020-02-18 DIAGNOSIS — Z23 NEED FOR VACCINATION: ICD-10-CM

## 2020-02-18 DIAGNOSIS — N95.1 MENOPAUSAL STATE: ICD-10-CM

## 2020-02-18 DIAGNOSIS — E55.9 VITAMIN D INSUFFICIENCY: ICD-10-CM

## 2020-02-18 DIAGNOSIS — Z12.31 ENCOUNTER FOR SCREENING MAMMOGRAM FOR BREAST CANCER: ICD-10-CM

## 2020-02-18 DIAGNOSIS — Z11.59 NEED FOR HEPATITIS C SCREENING TEST: ICD-10-CM

## 2020-02-18 DIAGNOSIS — E78.2 MIXED HYPERLIPIDEMIA: ICD-10-CM

## 2020-02-18 DIAGNOSIS — Z13.0 ENCOUNTER FOR SCREENING FOR HEMATOLOGIC DISORDER: ICD-10-CM

## 2020-02-18 DIAGNOSIS — K21.9 GASTROESOPHAGEAL REFLUX DISEASE, ESOPHAGITIS PRESENCE NOT SPECIFIED: ICD-10-CM

## 2020-02-18 DIAGNOSIS — Z78.0 POSTMENOPAUSAL STATUS (AGE-RELATED) (NATURAL): ICD-10-CM

## 2020-02-18 DIAGNOSIS — E04.1 THYROID NODULE: ICD-10-CM

## 2020-02-18 DIAGNOSIS — R10.2 PELVIC PAIN: ICD-10-CM

## 2020-02-18 PROCEDURE — 99214 OFFICE O/P EST MOD 30 MIN: CPT | Mod: 25 | Performed by: NURSE PRACTITIONER

## 2020-02-18 PROCEDURE — 90732 PPSV23 VACC 2 YRS+ SUBQ/IM: CPT | Performed by: NURSE PRACTITIONER

## 2020-02-18 PROCEDURE — G0009 ADMIN PNEUMOCOCCAL VACCINE: HCPCS | Performed by: NURSE PRACTITIONER

## 2020-02-18 RX ORDER — OMEPRAZOLE 40 MG/1
CAPSULE, DELAYED RELEASE ORAL
COMMUNITY
Start: 2020-01-22

## 2020-02-18 RX ORDER — NITROFURANTOIN 25; 75 MG/1; MG/1
CAPSULE ORAL
COMMUNITY
Start: 2020-01-20 | End: 2020-10-20

## 2020-02-18 ASSESSMENT — PATIENT HEALTH QUESTIONNAIRE - PHQ9: CLINICAL INTERPRETATION OF PHQ2 SCORE: 0

## 2020-02-18 NOTE — ASSESSMENT & PLAN NOTE
Patient has history of thyroid nodules, she does have annual thyroid ultrasounds which show her nodules to be quite stable.  She is due for labs before her follow-up appointment, this is been ordered.

## 2020-02-18 NOTE — ASSESSMENT & PLAN NOTE
Patient has recently been started on a PPI as prescribed per gastroenterology states symptoms have much improved.  She will continue on this medication and continue avoiding aggravating foods and activities.

## 2020-02-18 NOTE — ASSESSMENT & PLAN NOTE
The 10-year ASCVD risk score (Gricelda MEDEROS Jr., et al., 2013) is: 5.9%  Patient currently takes atorvastatin 20 mg once a week, labs have been stable.  Discussed the importance of lifestyle modifications.

## 2020-02-18 NOTE — PROGRESS NOTES
Chief Complaint   Patient presents with   • Gastrophageal Reflux   • Hyperlipidemia         This is a 64 y.o.female patient that presents today with the following: Follow-up visit, discuss acute and chronic conditions    Mixed hyperlipidemia  The 10-year ASCVD risk score ([place] GATITO Jr., et al., 2013) is: 5.9%  Patient currently takes atorvastatin 20 mg once a week, labs have been stable.  Discussed the importance of lifestyle modifications.    Gastroesophageal reflux disease  Patient has recently been started on a PPI as prescribed per gastroenterology states symptoms have much improved.  She will continue on this medication and continue avoiding aggravating foods and activities.    Pelvic pain  Patient has continued pelvic pain associated with chronically eroding mesh and is closely followed by urogynecology.    Thyroid nodule  Patient has history of thyroid nodules, she does have annual thyroid ultrasounds which show her nodules to be quite stable.  She is due for labs before her follow-up appointment, this is been ordered.    Vitamin D insufficiency  Patient does have history of vitamin D deficiency, on over-the-counter vitamin D supplement, up-to-date with labs and they are within normal limits.      No visits with results within 1 Month(s) from this visit.   Latest known visit with results is:   Hospital Outpatient Visit on 10/18/2019   Component Date Value   • 25-Hydroxy   Vitamin D 25 10/18/2019 32    • TSH 10/18/2019 0.400    • Cholesterol,Tot 10/18/2019 209*   • Triglycerides 10/18/2019 165*   • HDL 10/18/2019 58    • LDL 10/18/2019 118*   • Sodium 10/18/2019 140    • Potassium 10/18/2019 4.3    • Chloride 10/18/2019 107    • Co2 10/18/2019 28    • Anion Gap 10/18/2019 5.0    • Glucose 10/18/2019 94    • Bun 10/18/2019 10    • Creatinine 10/18/2019 0.74    • Calcium 10/18/2019 10.0    • AST(SGOT) 10/18/2019 20    • ALT(SGPT) 10/18/2019 20    • Alkaline Phosphatase 10/18/2019 86    • Total Bilirubin 10/18/2019  0.7    • Albumin 10/18/2019 4.4    • Total Protein 10/18/2019 6.8    • Globulin 10/18/2019 2.4    • A-G Ratio 10/18/2019 1.8    • Fasting Status 10/18/2019 Fasting    • GFR If  10/18/2019 >60    • GFR If Non  Ameri* 10/18/2019 >60          clinical course has been stable    Past Medical History:   Diagnosis Date   • Hyperlipidemia        Past Surgical History:   Procedure Laterality Date   • ABDOMINAL HYSTERECTOMY TOTAL     • BLADDER SLING FEMALE     • TONSILLECTOMY         No family history on file.    Sulfa drugs    Current Outpatient Medications Ordered in Epic   Medication Sig Dispense Refill   • Zoster Vac Recomb Adjuvanted (SHINGRIX) 50 MCG/0.5ML Recon Susp 0.5 mL by Intramuscular route Once for 1 dose. 0.5 mL 0   • nitrofurantoin monohyd macro (MACROBID) 100 MG Cap      • omeprazole (PRILOSEC) 40 MG delayed-release capsule      • atorvastatin (LIPITOR) 20 MG Tab TAKE 1 TABLET BY MOUTH ONCE WEEKLY 12 Tab 0   • tamsulosin (FLOMAX) 0.4 MG capsule Take 1 Cap by mouth every day. Take until kidney stone passes. 30 Cap 0   • calcium polycarbophil (FIBERCON) 625 MG Tab Take 625 mg by mouth every day.     • Cholecalciferol (VITAMIN D3) 2000 units Tab Take  by mouth.     • Multiple Vitamins-Minerals (CENTRUM SILVER 50+WOMEN PO) Take  by mouth.     • Omega-3 Fatty Acids (FISH OIL) 1000 MG Cap capsule Take 1,000 mg by mouth 3 times a day, with meals.     • aspirin 81 MG tablet Take 81 mg by mouth every day.     • famotidine (PEPCID) 20 MG Tab Take 20 mg by mouth 2 times a day.     • D-MANNOSE PO Take  by mouth.     • conjugated estrogen (PREMARIN) 0.625 MG/GM Cream Insert 0.5 g in vagina every day.       No current [facility]-ordered facility-administered medications on file.        Constitutional ROS: No unexpected change in weight, No weakness, No unexplained fevers, sweats, or chills  Pulmonary ROS: No chronic cough, sputum, or hemoptysis, No shortness of breath, No recent change in  "breathing  Cardiovascular ROS: No chest pain, No edema, No palpitations, Positive for hyperlipidemia  Gastrointestinal ROS: No abdominal pain, No nausea, vomiting, diarrhea, or constipation, Positive for GERD  Musculoskeletal/Extremities ROS: No clubbing, No peripheral edema, No pain, redness or swelling on the joints  Neurologic ROS: Normal development, No seizures, No weakness  Endocrine ROS: Positive per HPI    Physical exam:  /84   Pulse 91   Temp 36.6 °C (97.8 °F) (Temporal)   Resp 14   Ht 1.638 m (5' 4.5\")   Wt 74.8 kg (165 lb)   SpO2 97%   BMI 27.88 kg/m²   General Appearance: Very pleasant older female, alert, no distress, moderately overweight, well-groomed  Skin: Skin color, texture, turgor normal. No rashes or lesions.  Neck: negative findings: no asymmetry, masses, or scars, no adenopathy, trachea midline and normal to palpitation, unable to palpate thyroid nodules  Lungs: negative findings: normal respiratory rate and rhythm, lungs clear to auscultation  Heart: negative. RRR without murmur, gallop, or rubs.  No ectopy.  Abdomen: Abdomen soft, non-tender. BS normal. No masses,  No organomegaly  Musculoskeletal: negative findings: no evidence of joint instability, no evidence of muscle atrophy, no deformities present  Neurologic: intact, CN II through XII grossly intact    Medical decision making/discussion:       Mammogram and Dexa    Labs before you see me in October    Immunizations today    BP check in 1 week with ALEXANDER Solano was seen today for gastrophageal reflux and hyperlipidemia.    Diagnoses and all orders for this visit:    Mixed hyperlipidemia  -     Comp Metabolic Panel; Future  -     Lipid Profile; Future    Vitamin D insufficiency  -     VITAMIN D,25 HYDROXY; Future    Thyroid nodule  -     TSH WITH REFLEX TO FT4; Future    Gastroesophageal reflux disease, esophagitis presence not specified    Pelvic pain    Need for hepatitis C screening test  -     HCV Scrn ( [id number] " [employer]); Future    Encounter for screening for hematologic disorder  -     CBC WITH DIFFERENTIAL; Future    Encounter for screening mammogram for breast cancer  -     MA-SCREEN MAMMO W/CAD-BILAT; Future    Menopausal state  -     DS-BONE DENSITY STUDY (DEXA)    Need for vaccination  -     Zoster Vac Recomb Adjuvanted (SHINGRIX) 50 MCG/0.5ML Recon Susp; 0.5 mL by Intramuscular route Once for 1 dose.  -     Pneumovax Vaccine (PPSV23)    Postmenopausal status (age-related) (natural)  -     DS-BONE DENSITY STUDY (DEXA)        Return in about 8 months (around 10/18/2020) for Follow-up, Discuss Labs.        Please note that this dictation was created using voice recognition software. I have made every reasonable attempt to correct obvious errors, but I expect that there are errors of grammar and possibly content that I did not discover before finalizing the note.

## 2020-02-18 NOTE — ASSESSMENT & PLAN NOTE
Patient does have history of vitamin D deficiency, on over-the-counter vitamin D supplement, up-to-date with labs and they are within normal limits.

## 2020-02-18 NOTE — ASSESSMENT & PLAN NOTE
Patient has continued pelvic pain associated with chronically eroding mesh and is closely followed by urogynecology.

## 2020-02-18 NOTE — PATIENT INSTRUCTIONS
Mammogram and Dexa    Labs before you see me in October    Immunizations today    BP check in 1 week with MA

## 2020-02-25 ENCOUNTER — NON-PROVIDER VISIT (OUTPATIENT)
Dept: MEDICAL GROUP | Facility: PHYSICIAN GROUP | Age: 65
End: 2020-02-25
Payer: MEDICARE

## 2020-02-25 VITALS — SYSTOLIC BLOOD PRESSURE: 140 MMHG | DIASTOLIC BLOOD PRESSURE: 70 MMHG

## 2020-02-25 NOTE — NON-PROVIDER
Zunilda MEDEROS Dhara is a 64 y.o. female here for a non-provider visit for blood pressure check     If abnormal was an in office provider notified today (if so, indicate provider)? Yes  Routed to PCP? Yes

## 2020-03-11 ENCOUNTER — NON-PROVIDER VISIT (OUTPATIENT)
Dept: MEDICAL GROUP | Facility: PHYSICIAN GROUP | Age: 65
End: 2020-03-11

## 2020-03-11 VITALS — DIASTOLIC BLOOD PRESSURE: 84 MMHG | SYSTOLIC BLOOD PRESSURE: 136 MMHG

## 2020-03-11 NOTE — NON-PROVIDER
Zunilda Toneyroccofam is a 64 y.o. female here for a non-provider visit for BP check   Vitals:    03/11/20 1045   BP: 136/84     If abnormal, was an in office provider notified today? Yes  Routed to PCP? No

## 2020-05-06 DIAGNOSIS — E78.2 MIXED HYPERLIPIDEMIA: ICD-10-CM

## 2020-05-06 NOTE — TELEPHONE ENCOUNTER
Was the patient seen in the last year in this department? Yes    Does patient have an active prescription for medications requested? No     Received Request Via: Pharmacy    Pt met protocol?: Yes     Last OV 02/18/2020

## 2020-05-07 RX ORDER — ATORVASTATIN CALCIUM 20 MG/1
TABLET, FILM COATED ORAL
Qty: 12 TAB | Refills: 1 | Status: SHIPPED | OUTPATIENT
Start: 2020-05-07 | End: 2020-08-05

## 2020-05-07 NOTE — TELEPHONE ENCOUNTER
Last seen by PCP 02/18/2020. Will send 6 month(s) to the pharmacy.      2/18/2020:  Lab Results   Component Value Date/Time    CHOLSTRLTOT 209 (H) 10/18/2019 10:01 AM     (H) 10/18/2019 10:01 AM    HDL 58 10/18/2019 10:01 AM    TRIGLYCERIDE 165 (H) 10/18/2019 10:01 AM       Lab Results   Component Value Date/Time    SODIUM 140 10/18/2019 10:01 AM    SODIUM 141 10/18/2019 10:01 AM    POTASSIUM 4.3 10/18/2019 10:01 AM    POTASSIUM 4.2 10/18/2019 10:01 AM    CHLORIDE 107 10/18/2019 10:01 AM    CHLORIDE 107 10/18/2019 10:01 AM    CO2 28 10/18/2019 10:01 AM    CO2 27 10/18/2019 10:01 AM    GLUCOSE 94 10/18/2019 10:01 AM    GLUCOSE 91 10/18/2019 10:01 AM    BUN 10 10/18/2019 10:01 AM    BUN 10 10/18/2019 10:01 AM    CREATININE 0.74 10/18/2019 10:01 AM    CREATININE 0.79 10/18/2019 10:01 AM    BUNCREATRAT 16 06/12/2018 10:45 AM     Lab Results   Component Value Date/Time    ALKPHOSPHAT 86 10/18/2019 10:01 AM    ALKPHOSPHAT 85 10/18/2019 10:01 AM    ASTSGOT 20 10/18/2019 10:01 AM    ASTSGOT 19 10/18/2019 10:01 AM    ALTSGPT 20 10/18/2019 10:01 AM    ALTSGPT 19 10/18/2019 10:01 AM    TBILIRUBIN 0.7 10/18/2019 10:01 AM    TBILIRUBIN 0.7 10/18/2019 10:01 AM

## 2020-10-15 ENCOUNTER — HOSPITAL ENCOUNTER (OUTPATIENT)
Dept: LAB | Facility: MEDICAL CENTER | Age: 65
End: 2020-10-15
Attending: NURSE PRACTITIONER
Payer: MEDICARE

## 2020-10-15 DIAGNOSIS — Z13.0 ENCOUNTER FOR SCREENING FOR HEMATOLOGIC DISORDER: ICD-10-CM

## 2020-10-15 DIAGNOSIS — E78.2 MIXED HYPERLIPIDEMIA: ICD-10-CM

## 2020-10-15 DIAGNOSIS — Z11.59 NEED FOR HEPATITIS C SCREENING TEST: ICD-10-CM

## 2020-10-15 DIAGNOSIS — E04.1 THYROID NODULE: ICD-10-CM

## 2020-10-15 DIAGNOSIS — E55.9 VITAMIN D INSUFFICIENCY: ICD-10-CM

## 2020-10-15 LAB
BASOPHILS # BLD AUTO: 1.3 % (ref 0–1.8)
BASOPHILS # BLD: 0.08 K/UL (ref 0–0.12)
EOSINOPHIL # BLD AUTO: 0.06 K/UL (ref 0–0.51)
EOSINOPHIL NFR BLD: 1 % (ref 0–6.9)
ERYTHROCYTE [DISTWIDTH] IN BLOOD BY AUTOMATED COUNT: 45.5 FL (ref 35.9–50)
HCT VFR BLD AUTO: 46.8 % (ref 37–47)
HGB BLD-MCNC: 15.1 G/DL (ref 12–16)
IMM GRANULOCYTES # BLD AUTO: 0.01 K/UL (ref 0–0.11)
IMM GRANULOCYTES NFR BLD AUTO: 0.2 % (ref 0–0.9)
LYMPHOCYTES # BLD AUTO: 2.21 K/UL (ref 1–4.8)
LYMPHOCYTES NFR BLD: 35.2 % (ref 22–41)
MCH RBC QN AUTO: 29.2 PG (ref 27–33)
MCHC RBC AUTO-ENTMCNC: 32.3 G/DL (ref 33.6–35)
MCV RBC AUTO: 90.3 FL (ref 81.4–97.8)
MONOCYTES # BLD AUTO: 0.45 K/UL (ref 0–0.85)
MONOCYTES NFR BLD AUTO: 7.2 % (ref 0–13.4)
NEUTROPHILS # BLD AUTO: 3.46 K/UL (ref 2–7.15)
NEUTROPHILS NFR BLD: 55.1 % (ref 44–72)
NRBC # BLD AUTO: 0 K/UL
NRBC BLD-RTO: 0 /100 WBC
PLATELET # BLD AUTO: 237 K/UL (ref 164–446)
PMV BLD AUTO: 11.1 FL (ref 9–12.9)
RBC # BLD AUTO: 5.18 M/UL (ref 4.2–5.4)
TSH SERPL DL<=0.005 MIU/L-ACNC: 0.52 UIU/ML (ref 0.38–5.33)
WBC # BLD AUTO: 6.3 K/UL (ref 4.8–10.8)

## 2020-10-15 PROCEDURE — 36415 COLL VENOUS BLD VENIPUNCTURE: CPT

## 2020-10-15 PROCEDURE — G0472 HEP C SCREEN HIGH RISK/OTHER: HCPCS | Mod: GA

## 2020-10-15 PROCEDURE — 84443 ASSAY THYROID STIM HORMONE: CPT

## 2020-10-15 PROCEDURE — 80053 COMPREHEN METABOLIC PANEL: CPT

## 2020-10-15 PROCEDURE — 82306 VITAMIN D 25 HYDROXY: CPT

## 2020-10-15 PROCEDURE — 80061 LIPID PANEL: CPT

## 2020-10-15 PROCEDURE — 85025 COMPLETE CBC W/AUTO DIFF WBC: CPT

## 2020-10-16 LAB
25(OH)D3 SERPL-MCNC: 49 NG/ML (ref 30–100)
ALBUMIN SERPL BCP-MCNC: 4.8 G/DL (ref 3.2–4.9)
ALBUMIN/GLOB SERPL: 2.1 G/DL
ALP SERPL-CCNC: 109 U/L (ref 30–99)
ALT SERPL-CCNC: 24 U/L (ref 2–50)
ANION GAP SERPL CALC-SCNC: 14 MMOL/L (ref 7–16)
AST SERPL-CCNC: 20 U/L (ref 12–45)
BILIRUB SERPL-MCNC: 0.5 MG/DL (ref 0.1–1.5)
BUN SERPL-MCNC: 12 MG/DL (ref 8–22)
CALCIUM SERPL-MCNC: 9.6 MG/DL (ref 8.5–10.5)
CHLORIDE SERPL-SCNC: 106 MMOL/L (ref 96–112)
CHOLEST SERPL-MCNC: 191 MG/DL (ref 100–199)
CO2 SERPL-SCNC: 23 MMOL/L (ref 20–33)
CREAT SERPL-MCNC: 0.69 MG/DL (ref 0.5–1.4)
GLOBULIN SER CALC-MCNC: 2.3 G/DL (ref 1.9–3.5)
GLUCOSE SERPL-MCNC: 106 MG/DL (ref 65–99)
HCV AB SER QL: NORMAL
HDLC SERPL-MCNC: 63 MG/DL
LDLC SERPL CALC-MCNC: 105 MG/DL
POTASSIUM SERPL-SCNC: 4 MMOL/L (ref 3.6–5.5)
PROT SERPL-MCNC: 7.1 G/DL (ref 6–8.2)
SODIUM SERPL-SCNC: 143 MMOL/L (ref 135–145)
TRIGL SERPL-MCNC: 117 MG/DL (ref 0–149)

## 2020-10-20 ENCOUNTER — OFFICE VISIT (OUTPATIENT)
Dept: MEDICAL GROUP | Facility: PHYSICIAN GROUP | Age: 65
End: 2020-10-20
Payer: MEDICARE

## 2020-10-20 VITALS
BODY MASS INDEX: 27.32 KG/M2 | HEIGHT: 65 IN | WEIGHT: 164 LBS | TEMPERATURE: 98.2 F | SYSTOLIC BLOOD PRESSURE: 120 MMHG | OXYGEN SATURATION: 95 % | HEART RATE: 91 BPM | RESPIRATION RATE: 16 BRPM | DIASTOLIC BLOOD PRESSURE: 86 MMHG

## 2020-10-20 DIAGNOSIS — K21.9 GASTROESOPHAGEAL REFLUX DISEASE, UNSPECIFIED WHETHER ESOPHAGITIS PRESENT: ICD-10-CM

## 2020-10-20 DIAGNOSIS — E78.2 MIXED HYPERLIPIDEMIA: ICD-10-CM

## 2020-10-20 DIAGNOSIS — R73.01 ELEVATED FASTING GLUCOSE: ICD-10-CM

## 2020-10-20 DIAGNOSIS — E04.1 THYROID NODULE: ICD-10-CM

## 2020-10-20 PROCEDURE — 99214 OFFICE O/P EST MOD 30 MIN: CPT | Performed by: NURSE PRACTITIONER

## 2020-10-20 ASSESSMENT — FIBROSIS 4 INDEX: FIB4 SCORE: 1.14

## 2020-10-20 NOTE — ASSESSMENT & PLAN NOTE
Patient does have history of thyroid nodule for which she has annual thyroid ultrasound  She will be due next month, this has been ordered  Thyroid labs have been within normal limits  She will be due for repeat labs again in 6 months

## 2020-10-20 NOTE — ASSESSMENT & PLAN NOTE
The 10-year ASCVD risk score ([place] MEDEROS Jr., et al., 2013) is: 5.1%  Patient appropriately on statin, takes atorvastatin 20 mg daily  Lipid profile has improved  Advised to continue with healthy lifestyle modifications  Follow-up 6 months with labs

## 2020-10-20 NOTE — ASSESSMENT & PLAN NOTE
Chronic and stable  Well-controlled on current medications including omeprazole 40 mg daily  Does not need refills at this time  Advised to continue avoiding aggravating foods and activities

## 2020-10-20 NOTE — PATIENT INSTRUCTIONS
Make sure you get flu shot soon    Follow up thyroid us    Labs before you follow up in 6 months    Schedule mammo and Dexa scan

## 2020-10-20 NOTE — ASSESSMENT & PLAN NOTE
Patient had mildly elevated fasting glucose with recent labs  Does not carry diagnosis of prediabetes or type 2 diabetes  We will evaluate with hemoglobin A1c with next set of labs in 6 months  Discussed the importance of ongoing lifestyle modifications

## 2020-10-20 NOTE — PROGRESS NOTES
Chief Complaint   Patient presents with   • Hyperlipidemia         This is a 65 y.o.female patient that presents today with the following: Follow-up, review labs    Gastroesophageal reflux disease  Chronic and stable  Well-controlled on current medications including omeprazole 40 mg daily  Does not need refills at this time  Advised to continue avoiding aggravating foods and activities    Mixed hyperlipidemia  The 10-year ASCVD risk score ([place] MEDEROS Jr., et al., 2013) is: 5.1%  Patient appropriately on statin, takes atorvastatin 20 mg daily  Lipid profile has improved  Advised to continue with healthy lifestyle modifications  Follow-up 6 months with labs    Thyroid nodule  Patient does have history of thyroid nodule for which she has annual thyroid ultrasound  She will be due next month, this has been ordered  Thyroid labs have been within normal limits  She will be due for repeat labs again in 6 months      Elevated fasting glucose  Patient had mildly elevated fasting glucose with recent labs  Does not carry diagnosis of prediabetes or type 2 diabetes  We will evaluate with hemoglobin A1c with next set of labs in 6 months  Discussed the importance of ongoing lifestyle modifications      Hospital Outpatient Visit on 10/15/2020   Component Date Value   • TSH 10/15/2020 0.515    • Hepatitis C Antibody 10/15/2020 Non-Reactive    • 25-Hydroxy   Vitamin D 25 10/15/2020 49    • Cholesterol,Tot 10/15/2020 191    • Triglycerides 10/15/2020 117    • HDL 10/15/2020 63    • LDL 10/15/2020 105*   • WBC 10/15/2020 6.3    • RBC 10/15/2020 5.18    • Hemoglobin 10/15/2020 15.1    • Hematocrit 10/15/2020 46.8    • MCV 10/15/2020 90.3    • MCH 10/15/2020 29.2    • MCHC 10/15/2020 32.3*   • RDW 10/15/2020 45.5    • Platelet Count 10/15/2020 237    • MPV 10/15/2020 11.1    • Neutrophils-Polys 10/15/2020 55.10    • Lymphocytes 10/15/2020 35.20    • Monocytes 10/15/2020 7.20    • Eosinophils 10/15/2020 1.00    • Basophils 10/15/2020 1.30     • Immature Granulocytes 10/15/2020 0.20    • Nucleated RBC 10/15/2020 0.00    • Neutrophils (Absolute) 10/15/2020 3.46    • Lymphs (Absolute) 10/15/2020 2.21    • Monos (Absolute) 10/15/2020 0.45    • Eos (Absolute) 10/15/2020 0.06    • Baso (Absolute) 10/15/2020 0.08    • Immature Granulocytes (a* 10/15/2020 0.01    • NRBC (Absolute) 10/15/2020 0.00    • Sodium 10/15/2020 143    • Potassium 10/15/2020 4.0    • Chloride 10/15/2020 106    • Co2 10/15/2020 23    • Anion Gap 10/15/2020 14.0    • Glucose 10/15/2020 106*   • Bun 10/15/2020 12    • Creatinine 10/15/2020 0.69    • Calcium 10/15/2020 9.6    • AST(SGOT) 10/15/2020 20    • ALT(SGPT) 10/15/2020 24    • Alkaline Phosphatase 10/15/2020 109*   • Total Bilirubin 10/15/2020 0.5    • Albumin 10/15/2020 4.8    • Total Protein 10/15/2020 7.1    • Globulin 10/15/2020 2.3    • A-G Ratio 10/15/2020 2.1    • GFR If  10/15/2020 >60    • GFR If Non  Ameri* 10/15/2020 >60          clinical course has been stable    Past Medical History:   Diagnosis Date   • Hyperlipidemia        Past Surgical History:   Procedure Laterality Date   • ABDOMINAL HYSTERECTOMY TOTAL     • BLADDER SLING FEMALE     • TONSILLECTOMY         No family history on file.    Sulfa drugs    Current Outpatient Medications Ordered in Epic   Medication Sig Dispense Refill   • Psyllium (METAMUCIL FIBER PO) Take  by mouth.     • atorvastatin (LIPITOR) 20 MG Tab TAKE 1 TABLET BY MOUTH ONCE WEEKLY 12 Tab 3   • omeprazole (PRILOSEC) 40 MG delayed-release capsule      • Cholecalciferol (VITAMIN D3) 2000 units Tab Take  by mouth.     • Multiple Vitamins-Minerals (CENTRUM SILVER 50+WOMEN PO) Take  by mouth.     • aspirin 81 MG tablet Take 81 mg by mouth every day.     • D-MANNOSE PO Take  by mouth.     • conjugated estrogen (PREMARIN) 0.625 MG/GM Cream Insert 0.5 g in vagina every day. 2x weekly       No current Epic-ordered facility-administered medications on file.        Constitutional  "ROS: No unexpected change in weight, No weakness, No unexplained fevers, sweats, or chills  Pulmonary ROS: No chronic cough, sputum, or hemoptysis, No shortness of breath, No recent change in breathing  Cardiovascular ROS: No chest pain  Gastrointestinal ROS: No abdominal pain, No nausea, vomiting, diarrhea, or constipation, positive for GERD  Musculoskeletal/Extremities ROS: No clubbing, No peripheral edema, No pain, redness or swelling on the joints  Neurologic ROS: Normal development, No seizures, No weakness  Endocrine ROS: Positive per HPI    Physical exam:  /86   Pulse 91   Temp 36.8 °C (98.2 °F) (Temporal)   Resp 16   Ht 1.638 m (5' 4.5\")   Wt 74.4 kg (164 lb)   SpO2 95%   BMI 27.72 kg/m²   General Appearance: Very pleasant older female, alert, no distress, moderately overweight, well-groomed  Skin: Skin color, texture, turgor normal. No rashes or lesions.  Neck: negative findings: no asymmetry, masses, or scars, no adenopathy, trachea midline and normal to palpitation, unable to palpate thyroid nodule  Lungs: negative findings: normal respiratory rate and rhythm, lungs clear to auscultation  Heart: negative. RRR without murmur, gallop, or rubs.  No ectopy.  Abdomen: Abdomen soft, non-tender. BS normal. No masses,  No organomegaly  Musculoskeletal: negative findings: no evidence of joint instability, no evidence of muscle atrophy, no deformities present  Neurologic: intact, CN II through XII grossly intact    Medical decision making/discussion:     Make sure you get flu shot soon    Follow up thyroid us    Labs before you follow up in 6 months    Schedule mammo and Dexa scan    Xiomara was seen today for hyperlipidemia.    Diagnoses and all orders for this visit:    Mixed hyperlipidemia  -     Comp Metabolic Panel; Future  -     Lipid Profile; Future    Gastroesophageal reflux disease, unspecified whether esophagitis present    Thyroid nodule  -     TSH WITH REFLEX TO FT4; Future  -     US-SOFT " TISSUES OF HEAD - NECK; Future    Elevated fasting glucose  -     Comp Metabolic Panel; Future  -     HEMOGLOBIN A1C; Future        Return in about 6 months (around 4/20/2021) for Follow-up, Discuss Labs.        Please note that this dictation was created using voice recognition software. I have made every reasonable attempt to correct obvious errors, but I expect that there are errors of grammar and possibly content that I did not discover before finalizing the note.

## 2020-11-09 ENCOUNTER — HOSPITAL ENCOUNTER (OUTPATIENT)
Dept: RADIOLOGY | Facility: MEDICAL CENTER | Age: 65
End: 2020-11-09
Attending: NURSE PRACTITIONER
Payer: MEDICARE

## 2020-11-09 DIAGNOSIS — E04.1 THYROID NODULE: ICD-10-CM

## 2020-11-09 PROCEDURE — 76536 US EXAM OF HEAD AND NECK: CPT

## 2021-04-13 ENCOUNTER — HOSPITAL ENCOUNTER (OUTPATIENT)
Dept: LAB | Facility: MEDICAL CENTER | Age: 66
End: 2021-04-13
Attending: NURSE PRACTITIONER
Payer: MEDICARE

## 2021-04-13 DIAGNOSIS — E04.1 THYROID NODULE: ICD-10-CM

## 2021-04-13 DIAGNOSIS — R73.01 ELEVATED FASTING GLUCOSE: ICD-10-CM

## 2021-04-13 DIAGNOSIS — E78.2 MIXED HYPERLIPIDEMIA: ICD-10-CM

## 2021-04-13 LAB
ALBUMIN SERPL BCP-MCNC: 4.7 G/DL (ref 3.2–4.9)
ALBUMIN/GLOB SERPL: 1.9 G/DL
ALP SERPL-CCNC: 110 U/L (ref 30–99)
ALT SERPL-CCNC: 26 U/L (ref 2–50)
ANION GAP SERPL CALC-SCNC: 10 MMOL/L (ref 7–16)
AST SERPL-CCNC: 29 U/L (ref 12–45)
BILIRUB SERPL-MCNC: 0.5 MG/DL (ref 0.1–1.5)
BUN SERPL-MCNC: 12 MG/DL (ref 8–22)
CALCIUM SERPL-MCNC: 9.9 MG/DL (ref 8.5–10.5)
CHLORIDE SERPL-SCNC: 105 MMOL/L (ref 96–112)
CHOLEST SERPL-MCNC: 213 MG/DL (ref 100–199)
CO2 SERPL-SCNC: 25 MMOL/L (ref 20–33)
CREAT SERPL-MCNC: 0.83 MG/DL (ref 0.5–1.4)
EST. AVERAGE GLUCOSE BLD GHB EST-MCNC: 114 MG/DL
FASTING STATUS PATIENT QL REPORTED: NORMAL
GLOBULIN SER CALC-MCNC: 2.5 G/DL (ref 1.9–3.5)
GLUCOSE SERPL-MCNC: 106 MG/DL (ref 65–99)
HBA1C MFR BLD: 5.6 % (ref 4–5.6)
HDLC SERPL-MCNC: 53 MG/DL
LDLC SERPL CALC-MCNC: 130 MG/DL
POTASSIUM SERPL-SCNC: 4.6 MMOL/L (ref 3.6–5.5)
PROT SERPL-MCNC: 7.2 G/DL (ref 6–8.2)
SODIUM SERPL-SCNC: 140 MMOL/L (ref 135–145)
TRIGL SERPL-MCNC: 149 MG/DL (ref 0–149)

## 2021-04-13 PROCEDURE — 80061 LIPID PANEL: CPT

## 2021-04-13 PROCEDURE — 36415 COLL VENOUS BLD VENIPUNCTURE: CPT

## 2021-04-13 PROCEDURE — 84443 ASSAY THYROID STIM HORMONE: CPT

## 2021-04-13 PROCEDURE — 83036 HEMOGLOBIN GLYCOSYLATED A1C: CPT | Mod: GA

## 2021-04-13 PROCEDURE — 80053 COMPREHEN METABOLIC PANEL: CPT

## 2021-04-14 LAB — TSH SERPL DL<=0.005 MIU/L-ACNC: 0.59 UIU/ML (ref 0.38–5.33)

## 2021-04-20 ENCOUNTER — OFFICE VISIT (OUTPATIENT)
Dept: MEDICAL GROUP | Facility: PHYSICIAN GROUP | Age: 66
End: 2021-04-20
Payer: MEDICARE

## 2021-04-20 VITALS
HEIGHT: 64 IN | TEMPERATURE: 98.2 F | OXYGEN SATURATION: 99 % | DIASTOLIC BLOOD PRESSURE: 86 MMHG | WEIGHT: 161 LBS | HEART RATE: 105 BPM | RESPIRATION RATE: 12 BRPM | SYSTOLIC BLOOD PRESSURE: 128 MMHG | BODY MASS INDEX: 27.49 KG/M2

## 2021-04-20 DIAGNOSIS — E78.2 MIXED HYPERLIPIDEMIA: ICD-10-CM

## 2021-04-20 DIAGNOSIS — R73.01 ELEVATED FASTING GLUCOSE: ICD-10-CM

## 2021-04-20 DIAGNOSIS — E04.1 THYROID NODULE: ICD-10-CM

## 2021-04-20 DIAGNOSIS — Z23 NEED FOR TDAP VACCINATION: ICD-10-CM

## 2021-04-20 DIAGNOSIS — N95.1 MENOPAUSAL STATE: ICD-10-CM

## 2021-04-20 PROCEDURE — 90715 TDAP VACCINE 7 YRS/> IM: CPT | Performed by: NURSE PRACTITIONER

## 2021-04-20 PROCEDURE — 90471 IMMUNIZATION ADMIN: CPT | Performed by: NURSE PRACTITIONER

## 2021-04-20 PROCEDURE — 99213 OFFICE O/P EST LOW 20 MIN: CPT | Mod: 25 | Performed by: NURSE PRACTITIONER

## 2021-04-20 RX ORDER — CEFDINIR 300 MG/1
CAPSULE ORAL
COMMUNITY
Start: 2021-02-11 | End: 2021-04-20

## 2021-04-20 ASSESSMENT — FIBROSIS 4 INDEX: FIB4 SCORE: 1.58

## 2021-04-20 ASSESSMENT — PATIENT HEALTH QUESTIONNAIRE - PHQ9: CLINICAL INTERPRETATION OF PHQ2 SCORE: 0

## 2021-04-20 NOTE — ASSESSMENT & PLAN NOTE
The 10-year ASCVD risk score (Gricel[place] MEDEROS Jr., et al., 2013) is: 6.5%  On statin, takes atorvastatin 20 mg, can only tolerate this once a week  Lipids worsened over the past several months, but patient attributes this to diet, has not been eating as healthy as sh usually does  Will repeat labs again in 6 months

## 2021-04-20 NOTE — ASSESSMENT & PLAN NOTE
This is chronic and stable  Up to date with labs and annual thyroid US  Denies s/sx associated with this

## 2021-04-20 NOTE — ASSESSMENT & PLAN NOTE
Pt has hx of elevated fasting glucose without diagnosis of T2DM  A1c 5.6%  Discussed importance of ongoing healthy lifestyle modifications

## 2021-04-20 NOTE — PROGRESS NOTES
Chief Complaint   Patient presents with   • Lab Results       HISTORY OF PRESENT ILLNESS: Patient is a 65 y.o. female established patient who presents today to follow up, review labs    Thyroid nodule  This is chronic and stable  Up to date with labs and annual thyroid US  Denies s/sx associated with this    Mixed hyperlipidemia  The 10-year ASCVD risk score ([place] MEDEROS Jr., et al., 2013) is: 6.5%  On statin, takes atorvastatin 20 mg, can only tolerate this once a week  Lipids worsened over the past several months, but patient attributes this to diet, has not been eating as healthy as sh usually does  Will repeat labs again in 6 months    Elevated fasting glucose  Pt has hx of elevated fasting glucose without diagnosis of T2DM  A1c 5.6%  Discussed importance of ongoing healthy lifestyle modifications      Patient Active Problem List    Diagnosis Date Noted   • Elevated fasting glucose 10/20/2020   • Pelvic pain 02/18/2020   • Blood in stool 07/31/2019   • History of IBS 02/14/2019   • Thyroid nodule 03/13/2018   • Mixed hyperlipidemia 03/13/2018   • Post hysterectomy menopause 03/13/2018   • Gastroesophageal reflux disease 03/13/2018   • Chronic RUQ pain 03/13/2018   • Vitamin D insufficiency 03/13/2018   • Healthcare maintenance 03/13/2018       Allergies:Sulfa drugs    Current Outpatient Medications   Medication Sig Dispense Refill   • Psyllium (METAMUCIL FIBER PO) Take  by mouth.     • atorvastatin (LIPITOR) 20 MG Tab TAKE 1 TABLET BY MOUTH ONCE WEEKLY 12 Tab 3   • omeprazole (PRILOSEC) 40 MG delayed-release capsule      • Cholecalciferol (VITAMIN D3) 2000 units Tab Take  by mouth.     • Multiple Vitamins-Minerals (CENTRUM SILVER 50+WOMEN PO) Take  by mouth.     • aspirin 81 MG tablet Take 81 mg by mouth every day.     • D-MANNOSE PO Take  by mouth.     • conjugated estrogen (PREMARIN) 0.625 MG/GM Cream Insert 0.5 g in vagina every day. 2x weekly       No current facility-administered medications for this visit.  "      Social History     Tobacco Use   • Smoking status: Former Smoker     Types: Cigarettes   • Smokeless tobacco: Never Used   Substance Use Topics   • Alcohol use: No   • Drug use: No       No family status information on file.   History reviewed. No pertinent family history.    Review of Systems:   Constitutional: Negative for fever, chills, weight loss and malaise/fatigue.   Respiratory: Negative for cough, sputum production, shortness of breath and wheezing.    Cardiovascular: Negative for chest pain, palpitations, orthopnea and leg swelling.   Gastrointestinal: Negative for heartburn, nausea, vomiting and abdominal pain.   Genitourinary/Renal: Negative for dysuria, urgency and frequency.   Musculoskeletal: Negative for myalgias, back pain and joint pain.   Skin: Negative for rash and itching.   Neurological: Negative for dizziness, tingling, tremors, sensory change, focal weakness and headaches.   Endo/Heme/Allergies: positive per HPI    Exam:  /86   Pulse (!) 105   Temp 36.8 °C (98.2 °F) (Temporal)   Resp 12   Ht 1.626 m (5' 4\")   Wt 73 kg (161 lb)   SpO2 99%   General:  Well nourished, well developed female in NAD  Skin: warm, dry, intact, no evidence of rash or concerning lesions  Head: is grossly normal.  HEENT: eyes clear, conjunctiva normal, PERRLA  Pulmonary: Clear to ausculation. Normal effort. No rales, ronchi, or wheezing.  Cardiovascular: Regular rate and rhythm without murmur. Carotid and radial pulses are intact and equal bilaterally.  Abdomen: soft, non-tender, positive bowel sounds  Musculoskeletal: no clubbing, cyanosis, or edema.  Psych/mental: no depression, anxiety, hallucinations  Neuro: alert, intact, CN 2-12 grossly intact    Medical decision-making and discussion:      Encouraged pt to continue with healthy lifestyle modifications. Will repeat labs in 6 months      Assessment/Plan:  Xiomara was seen today for lab results.    Diagnoses and all orders for this visit:    Mixed " hyperlipidemia  -     Lipid Profile; Future  -     Comp Metabolic Panel; Future    Elevated fasting glucose    Thyroid nodule    Menopausal state  -     DS-BONE DENSITY STUDY (DEXA); Future    Need for Tdap vaccination  -     Tdap Vaccine =>8YO IM         Return in about 6 months (around 10/20/2021) for Follow-up, Discuss Labs.    Please note that this dictation was created using voice recognition software. I have made every reasonable attempt to correct obvious errors, but I expect that there are errors of grammar and possibly content that I did not discover before finalizing the note.

## 2021-04-28 ENCOUNTER — HOSPITAL ENCOUNTER (OUTPATIENT)
Dept: RADIOLOGY | Facility: MEDICAL CENTER | Age: 66
End: 2021-04-28
Attending: NURSE PRACTITIONER
Payer: MEDICARE

## 2021-04-28 DIAGNOSIS — N95.9 MENOPAUSAL PROBLEM: ICD-10-CM

## 2021-04-28 PROCEDURE — 77080 DXA BONE DENSITY AXIAL: CPT

## 2021-06-30 ENCOUNTER — OFFICE VISIT (OUTPATIENT)
Dept: MEDICAL GROUP | Facility: PHYSICIAN GROUP | Age: 66
End: 2021-06-30
Payer: MEDICARE

## 2021-06-30 VITALS
HEIGHT: 65 IN | DIASTOLIC BLOOD PRESSURE: 80 MMHG | RESPIRATION RATE: 16 BRPM | WEIGHT: 157 LBS | TEMPERATURE: 98.3 F | HEART RATE: 103 BPM | OXYGEN SATURATION: 98 % | BODY MASS INDEX: 26.16 KG/M2 | SYSTOLIC BLOOD PRESSURE: 130 MMHG

## 2021-06-30 DIAGNOSIS — M79.602 LEFT ARM PAIN: ICD-10-CM

## 2021-06-30 DIAGNOSIS — M54.2 NECK PAIN: ICD-10-CM

## 2021-06-30 PROCEDURE — 99214 OFFICE O/P EST MOD 30 MIN: CPT | Performed by: NURSE PRACTITIONER

## 2021-06-30 RX ORDER — AMOXICILLIN AND CLAVULANATE POTASSIUM 500; 125 MG/1; MG/1
TABLET, FILM COATED ORAL
COMMUNITY
Start: 2021-06-02

## 2021-06-30 RX ORDER — NAPROXEN 500 MG/1
500 TABLET ORAL 2 TIMES DAILY WITH MEALS
Qty: 60 TABLET | Refills: 0 | Status: SHIPPED | OUTPATIENT
Start: 2021-06-30 | End: 2021-09-08

## 2021-06-30 ASSESSMENT — FIBROSIS 4 INDEX: FIB4 SCORE: 1.58

## 2021-06-30 NOTE — ASSESSMENT & PLAN NOTE
Patient developed neck pain as well as left shoulder and upper arm pain about 4 weeks ago  She believes it was related to prolonged dermatologic procedure to have a basal cell carcinoma lesion removed off of her face  She does not have decreased range of motion to cervical spine but does have some mild tenderness with palpation to the left side of her neck, mild tenderness with palpation over her left supraspinatus as well as deltoid and bicep tendon   She does describe the pain as a numbness and tingling, however it is not constant  She has not tried over-the-counter analgesics, she is hesitant to try ibuprofen as she feels this causes her to have a headache she was advised of other supportive measures including ice and/or heat, gentle range of motion exercises and stretches, over-the-counter acetaminophen, will call in naproxen 500 mg twice daily with food  She does agree to referral to physical therapy for further evaluation and treatment options she does understand that if symptoms do not get better with physical therapy and over-the-counter analgesics as well as other supportive measures she may need advanced imaging such as CT scan or MRI as well as referral to orthopedic specialist

## 2021-06-30 NOTE — PROGRESS NOTES
Chief Complaint   Patient presents with   • Arm Injury     x 4 weeks       HISTORY OF PRESENT ILLNESS: Patient is a 65 y.o. female established patient who presents today to discuss arm pain secondary to injury    Neck pain  Patient developed neck pain as well as left shoulder and upper arm pain about 4 weeks ago  She believes it was related to prolonged dermatologic procedure to have a basal cell carcinoma lesion removed off of her face  She does not have decreased range of motion to cervical spine but does have some mild tenderness with palpation to the left side of her neck, mild tenderness with palpation over her left supraspinatus as well as deltoid and bicep tendon   She does describe the pain as a numbness and tingling, however it is not constant  She has not tried over-the-counter analgesics, she is hesitant to try ibuprofen as she feels this causes her to have a headache she was advised of other supportive measures including ice and/or heat, gentle range of motion exercises and stretches, over-the-counter acetaminophen, will call in naproxen 500 mg twice daily with food  She does agree to referral to physical therapy for further evaluation and treatment options she does understand that if symptoms do not get better with physical therapy and over-the-counter analgesics as well as other supportive measures she may need advanced imaging such as CT scan or MRI as well as referral to orthopedic specialist        Patient Active Problem List    Diagnosis Date Noted   • Neck pain 06/30/2021   • Left arm pain 06/30/2021   • Elevated fasting glucose 10/20/2020   • Pelvic pain 02/18/2020   • Blood in stool 07/31/2019   • History of IBS 02/14/2019   • Thyroid nodule 03/13/2018   • Mixed hyperlipidemia 03/13/2018   • Post hysterectomy menopause 03/13/2018   • Gastroesophageal reflux disease 03/13/2018   • Chronic RUQ pain 03/13/2018   • Vitamin D insufficiency 03/13/2018   • Healthcare maintenance 03/13/2018  "      Allergies:Sulfa drugs    Current Outpatient Medications   Medication Sig Dispense Refill   • naproxen (NAPROSYN) 500 MG Tab Take 1 tablet by mouth 2 times a day with meals. 60 tablet 0   • Psyllium (METAMUCIL FIBER PO) Take  by mouth.     • atorvastatin (LIPITOR) 20 MG Tab TAKE 1 TABLET BY MOUTH ONCE WEEKLY 12 Tab 3   • omeprazole (PRILOSEC) 40 MG delayed-release capsule      • Cholecalciferol (VITAMIN D3) 2000 units Tab Take  by mouth.     • Multiple Vitamins-Minerals (CENTRUM SILVER 50+WOMEN PO) Take  by mouth.     • aspirin 81 MG tablet Take 81 mg by mouth every day.     • D-MANNOSE PO Take  by mouth.     • conjugated estrogen (PREMARIN) 0.625 MG/GM Cream Insert 0.5 g in vagina every day. 2x weekly     • amoxicillin-clavulanate (AUGMENTIN) 500-125 MG Tab  (Patient not taking: Reported on 6/30/2021)       No current facility-administered medications for this visit.       Social History     Tobacco Use   • Smoking status: Former Smoker     Types: Cigarettes   • Smokeless tobacco: Never Used   Vaping Use   • Vaping Use: Never used   Substance Use Topics   • Alcohol use: No   • Drug use: No       No family status information on file.   History reviewed. No pertinent family history.    Review of Systems:   Constitutional: Negative for fever, chills, weight loss and malaise/fatigue.   Respiratory: Negative for cough, sputum production, shortness of breath and wheezing.    Cardiovascular: Negative for chest pain, palpitations, orthopnea and leg swelling.   Gastrointestinal: Negative for heartburn, nausea, vomiting and abdominal pain.   Genitourinary/Renal: Negative for dysuria, urgency and frequency.   Musculoskeletal: Positive per HPI  Neurological: Negative for dizziness, tingling, tremors, sensory change, focal weakness and headaches.   Endo/Heme/Allergies: Does not bruise/bleed easily.       Exam:  /80   Pulse (!) 103   Temp 36.8 °C (98.3 °F) (Temporal)   Resp 16   Ht 1.638 m (5' 4.5\")   Wt 71.2 kg " (157 lb)   SpO2 98%   General:  Well nourished, well developed female in NAD  Skin: warm, dry, intact, no evidence of rash or concerning lesions  Head: is grossly normal.  HEENT: eyes clear, conjunctiva normal, PERRLA  Pulmonary: Normal rate, rhythm and effort  Musculoskeletal: no clubbing, cyanosis, or edema.  Positive for tenderness with palpation to left posterior neck, low left supraspinatus muscle and left bicep tendon.  Minimally decreased range of motion to left upper extremity/shoulder  Psych/mental: no depression, anxiety, hallucinations  Neuro: alert, intact, CN 2-12 grossly intact    Medical decision-making and discussion:    As mentioned above, referral to physical therapy placed and she will start naproxen, advised to take with food.  Advised to follow-up if needed especially if pain does not improve, it worsens or she develops any other associated symptoms        Assessment/Plan:  Xiomara was seen today for arm injury.    Diagnoses and all orders for this visit:    Neck pain  -     REFERRAL TO PHYSICAL THERAPY    Left arm pain  -     REFERRAL TO PHYSICAL THERAPY    Other orders  -     naproxen (NAPROSYN) 500 MG Tab; Take 1 tablet by mouth 2 times a day with meals.         Return if symptoms worsen or fail to improve, for Follow-up.    Please note that this dictation was created using voice recognition software. I have made every reasonable attempt to correct obvious errors, but I expect that there are errors of grammar and possibly content that I did not discover before finalizing the note.

## 2021-09-08 RX ORDER — NAPROXEN 500 MG/1
TABLET ORAL
Qty: 60 TABLET | Refills: 0 | Status: SHIPPED | OUTPATIENT
Start: 2021-09-08

## 2021-09-08 NOTE — TELEPHONE ENCOUNTER
Received request via: Pharmacy    Was the patient seen in the last year in this department? Yes    Does the patient have an active prescription (recently filled or refills available) for medication(s) requested? No    Requested Prescriptions     Pending Prescriptions Disp Refills    naproxen (NAPROSYN) 500 MG Tab [Pharmacy Med Name: NAPROXEN 500MG TABLETS] 60 Tablet 0     Sig: TAKE 1 TABLET BY MOUTH TWICE DAILY WITH MEALS

## 2021-10-18 ENCOUNTER — HOSPITAL ENCOUNTER (OUTPATIENT)
Dept: LAB | Facility: MEDICAL CENTER | Age: 66
End: 2021-10-18
Attending: NURSE PRACTITIONER
Payer: MEDICARE

## 2021-10-18 DIAGNOSIS — E78.2 MIXED HYPERLIPIDEMIA: ICD-10-CM

## 2021-10-18 LAB
ALBUMIN SERPL BCP-MCNC: 4.8 G/DL (ref 3.2–4.9)
ALBUMIN/GLOB SERPL: 2 G/DL
ALP SERPL-CCNC: 94 U/L (ref 30–99)
ALT SERPL-CCNC: 18 U/L (ref 2–50)
ANION GAP SERPL CALC-SCNC: 11 MMOL/L (ref 7–16)
AST SERPL-CCNC: 20 U/L (ref 12–45)
BILIRUB SERPL-MCNC: 0.5 MG/DL (ref 0.1–1.5)
BUN SERPL-MCNC: 16 MG/DL (ref 8–22)
CALCIUM SERPL-MCNC: 10.1 MG/DL (ref 8.5–10.5)
CHLORIDE SERPL-SCNC: 107 MMOL/L (ref 96–112)
CHOLEST SERPL-MCNC: 221 MG/DL (ref 100–199)
CO2 SERPL-SCNC: 24 MMOL/L (ref 20–33)
CREAT SERPL-MCNC: 0.74 MG/DL (ref 0.5–1.4)
FASTING STATUS PATIENT QL REPORTED: NORMAL
GLOBULIN SER CALC-MCNC: 2.4 G/DL (ref 1.9–3.5)
GLUCOSE SERPL-MCNC: 107 MG/DL (ref 65–99)
HDLC SERPL-MCNC: 59 MG/DL
LDLC SERPL CALC-MCNC: 139 MG/DL
POTASSIUM SERPL-SCNC: 4.4 MMOL/L (ref 3.6–5.5)
PROT SERPL-MCNC: 7.2 G/DL (ref 6–8.2)
SODIUM SERPL-SCNC: 142 MMOL/L (ref 135–145)
TRIGL SERPL-MCNC: 116 MG/DL (ref 0–149)

## 2021-10-18 PROCEDURE — 36415 COLL VENOUS BLD VENIPUNCTURE: CPT

## 2021-10-18 PROCEDURE — 80053 COMPREHEN METABOLIC PANEL: CPT

## 2021-10-18 PROCEDURE — 80061 LIPID PANEL: CPT

## 2021-10-27 DIAGNOSIS — E04.1 THYROID NODULE: ICD-10-CM

## 2021-11-05 ENCOUNTER — HOSPITAL ENCOUNTER (OUTPATIENT)
Dept: RADIOLOGY | Facility: MEDICAL CENTER | Age: 66
End: 2021-11-05
Attending: NURSE PRACTITIONER
Payer: MEDICARE

## 2021-11-05 DIAGNOSIS — E04.1 THYROID NODULE: ICD-10-CM

## 2021-11-05 PROCEDURE — 76536 US EXAM OF HEAD AND NECK: CPT

## 2021-11-30 DIAGNOSIS — E78.2 MIXED HYPERLIPIDEMIA: ICD-10-CM

## 2021-11-30 RX ORDER — ATORVASTATIN CALCIUM 20 MG/1
TABLET, FILM COATED ORAL
Qty: 24 TABLET | Refills: 3 | Status: SHIPPED | OUTPATIENT
Start: 2021-11-30

## 2021-11-30 NOTE — TELEPHONE ENCOUNTER
Received request via: Patient  FREQUENCY INCREASED TO TWICE WEEKLY PER 10/20/21 PATIENT MESSAGE      Was the patient seen in the last year in this department? Yes     LAST OV 6/30/21    Does the patient have an active prescription (recently filled or refills available) for medication(s) requested? No    Requested Prescriptions     Pending Prescriptions Disp Refills   • atorvastatin (LIPITOR) 20 MG Tab 24 Tablet 3     Sig: TAKE 1 TABLET BY MOUTH TWICE WEEKLY

## 2021-12-01 DIAGNOSIS — Z13.0 ENCOUNTER FOR SCREENING FOR HEMATOLOGIC DISORDER: ICD-10-CM

## 2021-12-01 DIAGNOSIS — E55.9 VITAMIN D INSUFFICIENCY: ICD-10-CM

## 2021-12-01 DIAGNOSIS — E04.1 THYROID NODULE: ICD-10-CM

## 2021-12-01 DIAGNOSIS — R73.01 ELEVATED FASTING GLUCOSE: ICD-10-CM

## 2021-12-01 DIAGNOSIS — E78.2 MIXED HYPERLIPIDEMIA: ICD-10-CM

## 2024-06-22 ENCOUNTER — OFFICE VISIT (OUTPATIENT)
Dept: URGENT CARE | Facility: PHYSICIAN GROUP | Age: 69
End: 2024-06-22
Payer: MEDICARE

## 2024-06-22 VITALS
WEIGHT: 151.2 LBS | HEIGHT: 64 IN | HEART RATE: 96 BPM | TEMPERATURE: 98.2 F | OXYGEN SATURATION: 96 % | SYSTOLIC BLOOD PRESSURE: 124 MMHG | DIASTOLIC BLOOD PRESSURE: 76 MMHG | RESPIRATION RATE: 16 BRPM | BODY MASS INDEX: 25.81 KG/M2

## 2024-06-22 DIAGNOSIS — B96.89 ACUTE BACTERIAL SINUSITIS: ICD-10-CM

## 2024-06-22 DIAGNOSIS — J01.90 ACUTE BACTERIAL SINUSITIS: ICD-10-CM

## 2024-06-22 PROCEDURE — 3074F SYST BP LT 130 MM HG: CPT | Performed by: NURSE PRACTITIONER

## 2024-06-22 PROCEDURE — 99213 OFFICE O/P EST LOW 20 MIN: CPT | Performed by: NURSE PRACTITIONER

## 2024-06-22 PROCEDURE — 3078F DIAST BP <80 MM HG: CPT | Performed by: NURSE PRACTITIONER

## 2024-06-22 RX ORDER — AMOXICILLIN AND CLAVULANATE POTASSIUM 875; 125 MG/1; MG/1
1 TABLET, FILM COATED ORAL 2 TIMES DAILY
Qty: 28 TABLET | Refills: 0 | Status: SHIPPED | OUTPATIENT
Start: 2024-06-22 | End: 2024-06-28

## 2024-06-22 NOTE — PROGRESS NOTES
"Verbal consent was acquired by the patient to use Curse ambient listening note generation during this visit.    Subjective:     HPI:   History of Present Illness  The patient is a 68-year-old female who presents for evaluation of multiple medical concerns.    The patient typically experiences a cough during the allergy season, particularly when exposed to wind or cycling. However, her cough has progressively intensified since Monday, accompanied by a sensation of tightness. Concurrently, she developed a mild fever and chills yesterday afternoon. She typically experiences annual sinus infections, but without associated fever. Currently, she is undergoing treatment for a bladder infection with Macrobid. She reports no known exposure to sick individuals. She experiences significant sinus pressure and headache, but her ears remain unaffected. She does not take any allergy medication due to its sedative effects. She occasionally performs nasal rinses when her symptoms worsen.   She is allergic to SULFA.      Objective:     Exam:  /76 (BP Location: Left arm, Patient Position: Sitting, BP Cuff Size: Small adult)   Pulse 96   Temp 36.8 °C (98.2 °F) (Temporal)   Resp 16   Ht 1.626 m (5' 4\")   Wt 68.6 kg (151 lb 3.2 oz)   SpO2 96%   BMI 25.95 kg/m²  Body mass index is 25.95 kg/m².    Physical Exam  Vitals and nursing note reviewed.   Constitutional:       General: She is not in acute distress.     Appearance: Normal appearance. She is ill-appearing.   HENT:      Head: Normocephalic and atraumatic.      Right Ear: Tympanic membrane, ear canal and external ear normal.      Left Ear: Tympanic membrane, ear canal and external ear normal.      Nose: Nasal tenderness and congestion present.      Right Nostril: Occlusion present.      Left Nostril: Occlusion present.      Right Sinus: Maxillary sinus tenderness and frontal sinus tenderness present.      Left Sinus: Maxillary sinus tenderness and frontal sinus " tenderness present.      Mouth/Throat:      Mouth: Mucous membranes are moist.   Cardiovascular:      Rate and Rhythm: Normal rate and regular rhythm.      Pulses: Normal pulses.      Heart sounds: Normal heart sounds.   Pulmonary:      Effort: Pulmonary effort is normal.      Breath sounds: Normal breath sounds.   Musculoskeletal:         General: Normal range of motion.      Cervical back: Normal range of motion and neck supple. Tenderness present.   Lymphadenopathy:      Cervical: No cervical adenopathy.   Skin:     General: Skin is warm and dry.      Capillary Refill: Capillary refill takes less than 2 seconds.   Neurological:      General: No focal deficit present.      Mental Status: She is alert.           Assessment & Plan:     1. Acute bacterial sinusitis  amoxicillin-clavulanate (AUGMENTIN) 875-125 MG Tab          Assessment & Plan  1. Sinusitis, acute.  Provided patient with information on the etiology & pathogenesis of bacterial sinusitis. Recommend cool mist humidifier at home, use nasal saline wash (i.e. Nedi-Pot), may take OTC decongestant prn, and antibiotics as prescribed. Patient requested 14 days of abx, and was educated.  Tylenol/Motrin prn HA or discomfort. RTC for fever >4d, no improvement within 48-72h, or for any other questions or concerns.     2. Urinary tract infection.  The patient is currently on Macrobid. The patient is advised to complete the previously prescribed course of Macrobid.            Teri Kwon, ANN.P.R.N.      Please note that this dictation was created using voice recognition software. I have made every reasonable attempt to correct obvious errors, but I expect that there are errors of grammar and possibly content that I did not discover before finalizing the note.

## 2024-06-28 DIAGNOSIS — J01.90 ACUTE BACTERIAL SINUSITIS: ICD-10-CM

## 2024-06-28 DIAGNOSIS — B96.89 ACUTE BACTERIAL SINUSITIS: ICD-10-CM

## 2024-06-28 RX ORDER — AMOXICILLIN 875 MG/1
875 TABLET, COATED ORAL 2 TIMES DAILY
Qty: 14 TABLET | Refills: 0 | Status: SHIPPED | OUTPATIENT
Start: 2024-06-28 | End: 2024-07-05

## 2025-03-20 ENCOUNTER — OFFICE VISIT (OUTPATIENT)
Dept: URGENT CARE | Facility: PHYSICIAN GROUP | Age: 70
End: 2025-03-20
Payer: MEDICARE

## 2025-03-20 ENCOUNTER — HOSPITAL ENCOUNTER (OUTPATIENT)
Facility: MEDICAL CENTER | Age: 70
End: 2025-03-20
Payer: MEDICARE

## 2025-03-20 VITALS
HEART RATE: 105 BPM | OXYGEN SATURATION: 99 % | HEIGHT: 64 IN | SYSTOLIC BLOOD PRESSURE: 142 MMHG | TEMPERATURE: 97.5 F | DIASTOLIC BLOOD PRESSURE: 102 MMHG | WEIGHT: 157 LBS | RESPIRATION RATE: 12 BRPM | BODY MASS INDEX: 26.8 KG/M2

## 2025-03-20 DIAGNOSIS — R30.0 DYSURIA: ICD-10-CM

## 2025-03-20 DIAGNOSIS — N30.01 ACUTE CYSTITIS WITH HEMATURIA: ICD-10-CM

## 2025-03-20 LAB
APPEARANCE UR: NORMAL
BILIRUB UR STRIP-MCNC: NORMAL MG/DL
COLOR UR AUTO: NORMAL
GLUCOSE UR STRIP.AUTO-MCNC: NORMAL MG/DL
KETONES UR STRIP.AUTO-MCNC: NORMAL MG/DL
LEUKOCYTE ESTERASE UR QL STRIP.AUTO: NORMAL
NITRITE UR QL STRIP.AUTO: NORMAL
PH UR STRIP.AUTO: 5 [PH] (ref 5–8)
PROT UR QL STRIP: NORMAL MG/DL
RBC UR QL AUTO: NORMAL
SP GR UR STRIP.AUTO: <=1.005
UROBILINOGEN UR STRIP-MCNC: 0.2 MG/DL

## 2025-03-20 PROCEDURE — 99214 OFFICE O/P EST MOD 30 MIN: CPT

## 2025-03-20 PROCEDURE — 81002 URINALYSIS NONAUTO W/O SCOPE: CPT

## 2025-03-20 PROCEDURE — 3077F SYST BP >= 140 MM HG: CPT

## 2025-03-20 PROCEDURE — 3080F DIAST BP >= 90 MM HG: CPT

## 2025-03-20 PROCEDURE — 87086 URINE CULTURE/COLONY COUNT: CPT

## 2025-03-20 RX ORDER — NITROFURANTOIN 25; 75 MG/1; MG/1
100 CAPSULE ORAL 2 TIMES DAILY
Qty: 10 CAPSULE | Refills: 0 | Status: SHIPPED | OUTPATIENT
Start: 2025-03-20 | End: 2025-03-25

## 2025-03-20 ASSESSMENT — ENCOUNTER SYMPTOMS
BLOOD IN STOOL: 0
EYE REDNESS: 0
NAUSEA: 0
DIARRHEA: 0
BRUISES/BLEEDS EASILY: 0
FLANK PAIN: 1
EYE DISCHARGE: 0
VOMITING: 0
COUGH: 0
CONSTIPATION: 0
WHEEZING: 0
FEVER: 0

## 2025-03-20 NOTE — PROGRESS NOTES
Subjective:     Xiomara Jules is a 69 y.o. female who presents for Dysuria      Dysuria   This is a new problem. The current episode started in the past 7 days. The problem has been unchanged. The quality of the pain is described as burning and shooting. The pain is moderate. There has been no fever. Associated symptoms include flank pain, frequency, hesitancy and urgency. Pertinent negatives include no discharge, hematuria, nausea, possible pregnancy or vomiting.       Review of Systems   Constitutional:  Negative for fever.   HENT:  Negative for congestion and ear discharge.    Eyes:  Negative for discharge and redness.   Respiratory:  Negative for cough and wheezing.    Gastrointestinal:  Negative for blood in stool, constipation, diarrhea, nausea and vomiting.   Genitourinary:  Positive for dysuria, flank pain, frequency, hesitancy and urgency. Negative for hematuria.   Skin:  Negative for itching and rash.   Endo/Heme/Allergies:  Does not bruise/bleed easily.        CURRENT MEDICATIONS:  aspirin  atorvastatin Tabs  CENTRUM SILVER 50+WOMEN PO  D-MANNOSE PO  estrogens, conjugated Crea  METAMUCIL FIBER PO  naproxen Tabs  omeprazole  Vitamin D3 Tabs    Allergies:     Allergies   Allergen Reactions    Sulfa Drugs Rash and Vomiting     Pt has taken sulfa medications with no rxn       Current Problems: Xiomara Jules does not have any pertinent problems on file.  Past Surgical Hx:    Past Surgical History:   Procedure Laterality Date    ABDOMINAL HYSTERECTOMY TOTAL      BLADDER SLING FEMALE      TONSILLECTOMY        Past Social Hx:  reports that she has quit smoking. Her smoking use included cigarettes. She has never used smokeless tobacco. She reports that she does not drink alcohol and does not use drugs.   Past Family Hx:  Xiomara Jules family history is not on file.     (Allergies, Medications, & Tobacco/Substance Use were reconciled by the Medical Assistant and reviewed by myself. The family history is  "prepopulated)       Objective:     BP (!) 142/102   Pulse (!) 105   Temp 36.4 °C (97.5 °F) (Temporal)   Resp 12   Ht 1.626 m (5' 4\")   Wt 71.2 kg (157 lb)   SpO2 99%   BMI 26.95 kg/m²     Physical Exam  Constitutional:       General: She is not in acute distress.     Appearance: Normal appearance. She is not ill-appearing, toxic-appearing or diaphoretic.   HENT:      Head: Normocephalic and atraumatic.      Nose: Nose normal.   Eyes:      General: No scleral icterus.        Right eye: No discharge.         Left eye: No discharge.   Cardiovascular:      Rate and Rhythm: Normal rate.   Pulmonary:      Effort: Pulmonary effort is normal. No respiratory distress.   Abdominal:      General: Abdomen is flat.      Palpations: Abdomen is soft.   Musculoskeletal:         General: Normal range of motion.      Cervical back: No rigidity.   Skin:     General: Skin is warm and dry.   Neurological:      General: No focal deficit present.      Mental Status: She is alert and oriented to person, place, and time. Mental status is at baseline.   Psychiatric:         Behavior: Behavior normal.         Judgment: Judgment normal.         Assessment/Plan:     Zunilda was seen today for dysuria.    Diagnoses and all orders for this visit:    Dysuria  -     POCT Urinalysis    Acute cystitis with hematuria  -     nitrofurantoin (MACROBID) 100 MG Cap; Take 1 Capsule by mouth 2 times a day for 5 days.  -     URINE CULTURE(NEW); Future    Based on history of presenting illness, review of systems and physical exam findings, most likely etiology of dysuria is acute cystitis with hematuria. discussed symptomatic management with pharmacotherapy and over-the-counter medications.  On my exam I do see  signs /symptoms consistent with a bacterial infection currently requiring oral antibiotics.  Discussed the risks the benefits and the indications of all new medications prescribed today.  Strict return and ED precautions were discussed and all " questions were answered.    Differential diagnosis, natural history, supportive care, and indications for immediate follow-up discussed.    Advised the patient to follow-up with the primary care physician for recheck, reevaluation, and consideration of further management.    Please note that this dictation was created using voice recognition software. I have made reasonable attempt to correct obvious errors, but I expect that there are errors of grammar and possibly content that I did not discover before finalizing the note.    This note was electronically signed by Angie Valdes MD PhD

## 2025-03-22 LAB
BACTERIA UR CULT: NORMAL
SIGNIFICANT IND 70042: NORMAL
SITE SITE: NORMAL
SOURCE SOURCE: NORMAL

## 2025-06-28 ENCOUNTER — OFFICE VISIT (OUTPATIENT)
Dept: URGENT CARE | Facility: PHYSICIAN GROUP | Age: 70
End: 2025-06-28
Payer: MEDICARE

## 2025-06-28 ENCOUNTER — HOSPITAL ENCOUNTER (OUTPATIENT)
Facility: MEDICAL CENTER | Age: 70
End: 2025-06-28
Attending: FAMILY MEDICINE
Payer: MEDICARE

## 2025-06-28 VITALS
TEMPERATURE: 97.8 F | OXYGEN SATURATION: 97 % | WEIGHT: 150 LBS | BODY MASS INDEX: 25.61 KG/M2 | SYSTOLIC BLOOD PRESSURE: 132 MMHG | DIASTOLIC BLOOD PRESSURE: 82 MMHG | HEART RATE: 74 BPM | RESPIRATION RATE: 16 BRPM | HEIGHT: 64 IN

## 2025-06-28 DIAGNOSIS — R39.9 UTI SYMPTOMS: Primary | ICD-10-CM

## 2025-06-28 DIAGNOSIS — R39.9 UTI SYMPTOMS: ICD-10-CM

## 2025-06-28 LAB
APPEARANCE UR: CLEAR
BILIRUB UR STRIP-MCNC: NORMAL MG/DL
COLOR UR AUTO: YELLOW
GLUCOSE UR STRIP.AUTO-MCNC: NORMAL MG/DL
KETONES UR STRIP.AUTO-MCNC: NORMAL MG/DL
LEUKOCYTE ESTERASE UR QL STRIP.AUTO: NORMAL
NITRITE UR QL STRIP.AUTO: NORMAL
PH UR STRIP.AUTO: 5.5 [PH] (ref 5–8)
PROT UR QL STRIP: NORMAL MG/DL
RBC UR QL AUTO: NORMAL
SP GR UR STRIP.AUTO: 1.01
UROBILINOGEN UR STRIP-MCNC: 0.2 MG/DL

## 2025-06-28 PROCEDURE — 87086 URINE CULTURE/COLONY COUNT: CPT

## 2025-06-28 RX ORDER — AZITHROMYCIN 250 MG/1
TABLET, FILM COATED ORAL
COMMUNITY
Start: 2025-05-07

## 2025-06-28 RX ORDER — CALCIUM CARBONATE 500(1250)
TABLET ORAL
COMMUNITY

## 2025-06-28 RX ORDER — NEOMYCIN SULFATE, POLYMYXIN B SULFATE AND DEXAMETHASONE 3.5; 10000; 1 MG/ML; [USP'U]/ML; MG/ML
SUSPENSION/ DROPS OPHTHALMIC
COMMUNITY
Start: 2025-05-05

## 2025-06-28 RX ORDER — NITROFURANTOIN 25; 75 MG/1; MG/1
100 CAPSULE ORAL 2 TIMES DAILY
Qty: 10 CAPSULE | Refills: 0 | Status: SHIPPED | OUTPATIENT
Start: 2025-06-28 | End: 2025-07-03

## 2025-06-30 LAB
BACTERIA UR CULT: NORMAL
SIGNIFICANT IND 70042: NORMAL
SITE SITE: NORMAL
SOURCE SOURCE: NORMAL